# Patient Record
Sex: MALE | Race: WHITE | Employment: FULL TIME | URBAN - NONMETROPOLITAN AREA
[De-identification: names, ages, dates, MRNs, and addresses within clinical notes are randomized per-mention and may not be internally consistent; named-entity substitution may affect disease eponyms.]

---

## 2019-02-08 ENCOUNTER — HOSPITAL ENCOUNTER (EMERGENCY)
Age: 19
Discharge: HOME | End: 2019-02-08
Attending: EMERGENCY MEDICINE
Payer: COMMERCIAL

## 2019-02-08 ENCOUNTER — APPOINTMENT (OUTPATIENT)
Dept: GENERAL RADIOLOGY | Age: 19
End: 2019-02-08
Payer: COMMERCIAL

## 2019-02-08 VITALS
SYSTOLIC BLOOD PRESSURE: 127 MMHG | DIASTOLIC BLOOD PRESSURE: 69 MMHG | OXYGEN SATURATION: 96 % | HEART RATE: 68 BPM | RESPIRATION RATE: 16 BRPM | WEIGHT: 260.14 LBS | TEMPERATURE: 97.9 F

## 2019-02-08 DIAGNOSIS — R07.9 CHEST PAIN, UNSPECIFIED TYPE: Primary | ICD-10-CM

## 2019-02-08 LAB
ALBUMIN SERPL-MCNC: 4.7 G/DL (ref 3.5–5.2)
ALP BLD-CCNC: 86 U/L (ref 40–130)
ALT SERPL-CCNC: 44 U/L (ref 5–41)
ANION GAP SERPL CALCULATED.3IONS-SCNC: 12 MMOL/L (ref 7–19)
AST SERPL-CCNC: 33 U/L (ref 5–40)
BASOPHILS ABSOLUTE: 0.1 K/UL (ref 0–0.2)
BASOPHILS RELATIVE PERCENT: 0.6 % (ref 0–1)
BILIRUB SERPL-MCNC: 0.3 MG/DL (ref 0.2–1.2)
BUN BLDV-MCNC: 15 MG/DL (ref 6–20)
CALCIUM SERPL-MCNC: 9.8 MG/DL (ref 8.6–10)
CHLORIDE BLD-SCNC: 101 MMOL/L (ref 98–111)
CO2: 26 MMOL/L (ref 22–29)
CREAT SERPL-MCNC: 0.9 MG/DL (ref 0.5–1.2)
D DIMER: 0.38 UG/ML FEU (ref 0–0.48)
EKG P AXIS: 22 DEGREES
EKG P-R INTERVAL: 146 MS
EKG Q-T INTERVAL: 330 MS
EKG QRS DURATION: 84 MS
EKG QTC CALCULATION (BAZETT): 398 MS
EKG T AXIS: -12 DEGREES
EOSINOPHILS ABSOLUTE: 0.2 K/UL (ref 0–0.6)
EOSINOPHILS RELATIVE PERCENT: 1.8 % (ref 0–5)
GFR NON-AFRICAN AMERICAN: >60
GLUCOSE BLD-MCNC: 99 MG/DL (ref 74–109)
HCT VFR BLD CALC: 50.4 % (ref 42–52)
HEMOGLOBIN: 16.9 G/DL (ref 14–18)
LIPASE: 35 U/L (ref 13–60)
LYMPHOCYTES ABSOLUTE: 3 K/UL (ref 1.1–4.5)
LYMPHOCYTES RELATIVE PERCENT: 34.9 % (ref 20–40)
MCH RBC QN AUTO: 27.7 PG (ref 27–31)
MCHC RBC AUTO-ENTMCNC: 33.5 G/DL (ref 33–37)
MCV RBC AUTO: 82.5 FL (ref 80–94)
MONOCYTES ABSOLUTE: 0.9 K/UL (ref 0–0.9)
MONOCYTES RELATIVE PERCENT: 10.1 % (ref 0–10)
NEUTROPHILS ABSOLUTE: 4.5 K/UL (ref 1.5–7.5)
NEUTROPHILS RELATIVE PERCENT: 51.9 % (ref 50–65)
PDW BLD-RTO: 13.2 % (ref 11.5–14.5)
PLATELET # BLD: 188 K/UL (ref 130–400)
PMV BLD AUTO: 11.3 FL (ref 9.4–12.4)
POTASSIUM REFLEX MAGNESIUM: 4.1 MMOL/L (ref 3.5–5)
RBC # BLD: 6.11 M/UL (ref 4.7–6.1)
SODIUM BLD-SCNC: 139 MMOL/L (ref 136–145)
TOTAL PROTEIN: 7.8 G/DL (ref 6.6–8.7)
TROPONIN: <0.01 NG/ML (ref 0–0.03)
TROPONIN: <0.01 NG/ML (ref 0–0.03)
WBC # BLD: 8.7 K/UL (ref 4.8–10.8)

## 2019-02-08 PROCEDURE — C8928 TTE W OR W/O FOL W/CON,STRES: HCPCS

## 2019-02-08 PROCEDURE — 36415 COLL VENOUS BLD VENIPUNCTURE: CPT

## 2019-02-08 PROCEDURE — 85379 FIBRIN DEGRADATION QUANT: CPT

## 2019-02-08 PROCEDURE — 80053 COMPREHEN METABOLIC PANEL: CPT

## 2019-02-08 PROCEDURE — 71045 X-RAY EXAM CHEST 1 VIEW: CPT

## 2019-02-08 PROCEDURE — 6370000000 HC RX 637 (ALT 250 FOR IP): Performed by: EMERGENCY MEDICINE

## 2019-02-08 PROCEDURE — 93005 ELECTROCARDIOGRAM TRACING: CPT

## 2019-02-08 PROCEDURE — 96375 TX/PRO/DX INJ NEW DRUG ADDON: CPT

## 2019-02-08 PROCEDURE — 96376 TX/PRO/DX INJ SAME DRUG ADON: CPT

## 2019-02-08 PROCEDURE — 6360000002 HC RX W HCPCS: Performed by: EMERGENCY MEDICINE

## 2019-02-08 PROCEDURE — 85025 COMPLETE CBC W/AUTO DIFF WBC: CPT

## 2019-02-08 PROCEDURE — 96374 THER/PROPH/DIAG INJ IV PUSH: CPT

## 2019-02-08 PROCEDURE — 84484 ASSAY OF TROPONIN QUANT: CPT

## 2019-02-08 PROCEDURE — 83690 ASSAY OF LIPASE: CPT

## 2019-02-08 PROCEDURE — 2580000003 HC RX 258: Performed by: EMERGENCY MEDICINE

## 2019-02-08 PROCEDURE — 6360000004 HC RX CONTRAST MEDICATION: Performed by: EMERGENCY MEDICINE

## 2019-02-08 PROCEDURE — 99285 EMERGENCY DEPT VISIT HI MDM: CPT

## 2019-02-08 PROCEDURE — 2500000003 HC RX 250 WO HCPCS: Performed by: EMERGENCY MEDICINE

## 2019-02-08 PROCEDURE — 99285 EMERGENCY DEPT VISIT HI MDM: CPT | Performed by: EMERGENCY MEDICINE

## 2019-02-08 RX ORDER — SODIUM CHLORIDE 0.9 % (FLUSH) 0.9 %
10 SYRINGE (ML) INJECTION PRN
Status: DISCONTINUED | OUTPATIENT
Start: 2019-02-08 | End: 2019-02-08 | Stop reason: HOSPADM

## 2019-02-08 RX ORDER — ONDANSETRON 2 MG/ML
4 INJECTION INTRAMUSCULAR; INTRAVENOUS ONCE
Status: COMPLETED | OUTPATIENT
Start: 2019-02-08 | End: 2019-02-08

## 2019-02-08 RX ORDER — ASPIRIN 81 MG/1
324 TABLET, CHEWABLE ORAL ONCE
Status: COMPLETED | OUTPATIENT
Start: 2019-02-08 | End: 2019-02-08

## 2019-02-08 RX ORDER — HYDROCODONE BITARTRATE AND IBUPROFEN 7.5; 2 MG/1; MG/1
1 TABLET, FILM COATED ORAL EVERY 8 HOURS PRN
Qty: 10 TABLET | Refills: 0 | Status: SHIPPED | OUTPATIENT
Start: 2019-02-08 | End: 2019-02-11

## 2019-02-08 RX ORDER — SODIUM CHLORIDE 9 MG/ML
INJECTION, SOLUTION INTRAVENOUS
Status: COMPLETED | OUTPATIENT
Start: 2019-02-08 | End: 2019-02-08

## 2019-02-08 RX ORDER — MORPHINE SULFATE 2 MG/ML
2 INJECTION, SOLUTION INTRAMUSCULAR; INTRAVENOUS ONCE
Status: COMPLETED | OUTPATIENT
Start: 2019-02-08 | End: 2019-02-08

## 2019-02-08 RX ORDER — NITROGLYCERIN 0.4 MG/1
0.4 TABLET SUBLINGUAL EVERY 5 MIN PRN
Status: DISCONTINUED | OUTPATIENT
Start: 2019-02-08 | End: 2019-02-08 | Stop reason: HOSPADM

## 2019-02-08 RX ORDER — METOPROLOL TARTRATE 5 MG/5ML
5 INJECTION INTRAVENOUS PRN
Status: DISCONTINUED | OUTPATIENT
Start: 2019-02-08 | End: 2019-02-08 | Stop reason: HOSPADM

## 2019-02-08 RX ORDER — ATROPINE SULFATE 0.1 MG/ML
1 INJECTION INTRAVENOUS PRN
Status: DISCONTINUED | OUTPATIENT
Start: 2019-02-08 | End: 2019-02-08 | Stop reason: HOSPADM

## 2019-02-08 RX ORDER — KETOROLAC TROMETHAMINE 30 MG/ML
30 INJECTION, SOLUTION INTRAMUSCULAR; INTRAVENOUS ONCE
Status: COMPLETED | OUTPATIENT
Start: 2019-02-08 | End: 2019-02-08

## 2019-02-08 RX ORDER — 0.9 % SODIUM CHLORIDE 0.9 %
250 INTRAVENOUS SOLUTION INTRAVENOUS ONCE
Status: COMPLETED | OUTPATIENT
Start: 2019-02-08 | End: 2019-02-08

## 2019-02-08 RX ORDER — DOBUTAMINE HYDROCHLORIDE 200 MG/100ML
10 INJECTION INTRAVENOUS CONTINUOUS PRN
Status: DISCONTINUED | OUTPATIENT
Start: 2019-02-08 | End: 2019-02-08 | Stop reason: HOSPADM

## 2019-02-08 RX ADMIN — SODIUM CHLORIDE 250 ML: 9 INJECTION, SOLUTION INTRAVENOUS at 11:37

## 2019-02-08 RX ADMIN — Medication 10 ML: at 16:34

## 2019-02-08 RX ADMIN — SODIUM CHLORIDE: 9 INJECTION, SOLUTION INTRAVENOUS at 16:35

## 2019-02-08 RX ADMIN — NITROGLYCERIN 0.4 MG: 0.4 TABLET, ORALLY DISINTEGRATING SUBLINGUAL at 11:00

## 2019-02-08 RX ADMIN — MORPHINE SULFATE 2 MG: 2 INJECTION, SOLUTION INTRAMUSCULAR; INTRAVENOUS at 11:38

## 2019-02-08 RX ADMIN — METOPROLOL TARTRATE 5 MG: 1 INJECTION, SOLUTION INTRAVENOUS at 16:53

## 2019-02-08 RX ADMIN — ATROPINE SULFATE 1 MG: 0.1 INJECTION PARENTERAL at 16:46

## 2019-02-08 RX ADMIN — PERFLUTREN 2.2 MG: 6.52 INJECTION, SUSPENSION INTRAVENOUS at 16:32

## 2019-02-08 RX ADMIN — ASPIRIN 81 MG CHEWABLE TABLET 324 MG: 81 TABLET CHEWABLE at 10:59

## 2019-02-08 RX ADMIN — ONDANSETRON 4 MG: 2 INJECTION, SOLUTION INTRAMUSCULAR; INTRAVENOUS at 11:13

## 2019-02-08 RX ADMIN — DOBUTAMINE HYDROCHLORIDE 10 MCG/KG/MIN: 200 INJECTION INTRAVENOUS at 16:36

## 2019-02-08 RX ADMIN — KETOROLAC TROMETHAMINE 30 MG: 30 INJECTION, SOLUTION INTRAMUSCULAR; INTRAVENOUS at 15:03

## 2019-02-08 RX ADMIN — MORPHINE SULFATE 2 MG: 2 INJECTION, SOLUTION INTRAMUSCULAR; INTRAVENOUS at 14:21

## 2019-02-08 ASSESSMENT — ENCOUNTER SYMPTOMS
VOMITING: 1
COUGH: 0
SHORTNESS OF BREATH: 1
NAUSEA: 1
ABDOMINAL PAIN: 0
BACK PAIN: 0

## 2019-02-08 ASSESSMENT — PAIN SCALES - GENERAL
PAINLEVEL_OUTOF10: 6
PAINLEVEL_OUTOF10: 8
PAINLEVEL_OUTOF10: 4
PAINLEVEL_OUTOF10: 7

## 2019-02-12 LAB
EKG P AXIS: 13 DEGREES
EKG P-R INTERVAL: 178 MS
EKG Q-T INTERVAL: 402 MS
EKG QRS DURATION: 86 MS
EKG QTC CALCULATION (BAZETT): 405 MS
EKG T AXIS: -9 DEGREES

## 2019-09-04 ENCOUNTER — TRANSCRIBE ORDERS (OUTPATIENT)
Dept: ADMINISTRATIVE | Facility: HOSPITAL | Age: 19
End: 2019-09-04

## 2019-09-04 DIAGNOSIS — M79.601 RIGHT ARM PAIN: Primary | ICD-10-CM

## 2019-10-01 ENCOUNTER — HOSPITAL ENCOUNTER (OUTPATIENT)
Dept: NEUROLOGY | Facility: HOSPITAL | Age: 19
Discharge: HOME | End: 2019-10-01
Admitting: PHYSICIAN ASSISTANT

## 2019-10-01 DIAGNOSIS — M79.601 RIGHT ARM PAIN: ICD-10-CM

## 2019-10-01 PROCEDURE — 95909 NRV CNDJ TST 5-6 STUDIES: CPT

## 2019-10-01 PROCEDURE — 95886 MUSC TEST DONE W/N TEST COMP: CPT

## 2020-01-03 ENCOUNTER — TELEPHONE (OUTPATIENT)
Dept: URGENT CARE | Facility: CLINIC | Age: 20
End: 2020-01-03

## 2020-01-03 ENCOUNTER — HOSPITAL ENCOUNTER (OUTPATIENT)
Dept: GENERAL RADIOLOGY | Facility: HOSPITAL | Age: 20
Discharge: HOME | End: 2020-01-03
Admitting: NURSE PRACTITIONER

## 2020-01-03 ENCOUNTER — HOSPITAL ENCOUNTER (OUTPATIENT)
Dept: GENERAL RADIOLOGY | Facility: HOSPITAL | Age: 20
Discharge: HOME | End: 2020-01-03

## 2020-01-03 PROCEDURE — 72070 X-RAY EXAM THORAC SPINE 2VWS: CPT

## 2020-01-03 PROCEDURE — 72100 X-RAY EXAM L-S SPINE 2/3 VWS: CPT

## 2020-01-10 ENCOUNTER — APPOINTMENT (OUTPATIENT)
Dept: LAB | Facility: HOSPITAL | Age: 20
End: 2020-01-10

## 2020-01-10 ENCOUNTER — TRANSCRIBE ORDERS (OUTPATIENT)
Dept: ADMINISTRATIVE | Facility: HOSPITAL | Age: 20
End: 2020-01-10

## 2020-01-10 DIAGNOSIS — N46.01 AZOOSPERMIA: Primary | ICD-10-CM

## 2020-01-10 LAB
CHARACTER SMN: NORMAL
COLLECTION METHOD SMN: ABNORMAL
EPI CELLS #/AREA SMN HPF: ABNORMAL /HPF
LIQUEFACTION TIME SMN: NORMAL MIN
NON PROGRESSIVE MOTILITY: 6.1 %
RBC #/AREA SMN HPF: ABNORMAL /HPF
SEX ABSTIN DURATION TIME PATIENT: 3 DAYS
SLOW PROGRESSIVE MOTILITY: 5.9 %
SMI: 26 (ref 80–400)
SPEC CONTAINER SPEC: ABNORMAL
SPECIMEN VOL SMN: 0.8 ML
SPERM # SMN: 76.6 MILLIONS/ML
SPERM IMMOTILE NFR SMN: 86.4 %
SPERM MOTILE NFR SMN: 13.6 % MOTILE (ref 50–100)
SPERM PROG NFR SMN: 1.6 % (ref 25–100)
SPERM SMN: 3.1 % NORMAL (ref 15–100)
VISC SMN: NORMAL CP
WBC SMN QL: ABNORMAL /HPF
WHO STANDARD: ABNORMAL

## 2020-01-10 PROCEDURE — 89320 SEMEN ANAL VOL/COUNT/MOT: CPT | Performed by: ADVANCED PRACTICE MIDWIFE

## 2020-03-18 ENCOUNTER — TRANSCRIBE ORDERS (OUTPATIENT)
Dept: PHYSICAL THERAPY | Facility: CLINIC | Age: 20
End: 2020-03-18

## 2020-03-18 DIAGNOSIS — M54.50 LOW BACK PAIN WITHOUT SCIATICA, UNSPECIFIED BACK PAIN LATERALITY, UNSPECIFIED CHRONICITY: Primary | ICD-10-CM

## 2020-05-05 ENCOUNTER — TREATMENT (OUTPATIENT)
Dept: PHYSICAL THERAPY | Facility: CLINIC | Age: 20
End: 2020-05-05

## 2020-05-05 DIAGNOSIS — M54.40 CHRONIC BILATERAL LOW BACK PAIN WITH SCIATICA, SCIATICA LATERALITY UNSPECIFIED: ICD-10-CM

## 2020-05-05 DIAGNOSIS — G89.29 CHRONIC BILATERAL LOW BACK PAIN WITH SCIATICA, SCIATICA LATERALITY UNSPECIFIED: ICD-10-CM

## 2020-05-05 DIAGNOSIS — M54.6 CHRONIC BILATERAL THORACIC BACK PAIN: ICD-10-CM

## 2020-05-05 DIAGNOSIS — G89.29 CHRONIC BILATERAL THORACIC BACK PAIN: ICD-10-CM

## 2020-05-05 PROCEDURE — 97110 THERAPEUTIC EXERCISES: CPT | Performed by: PHYSICAL THERAPIST

## 2020-05-05 PROCEDURE — 97161 PT EVAL LOW COMPLEX 20 MIN: CPT | Performed by: PHYSICAL THERAPIST

## 2020-05-05 PROCEDURE — G0283 ELEC STIM OTHER THAN WOUND: HCPCS | Performed by: PHYSICAL THERAPIST

## 2020-05-05 NOTE — PROGRESS NOTES
"Outpatient Physical Therapy Ortho Initial Evaluation       Patient Name: Chris Hernandez  : 2000  MRN: [id number]  Today's Date: 2020      Visit Date: 2020    There is no problem list on file for this patient.       Past Medical History:   Diagnosis Date   • Anxiety    • PTSD (post-traumatic stress disorder)         Past Surgical History:   Procedure Laterality Date   • APPENDECTOMY         Visit Dx:     ICD-10-CM ICD-9-CM   1. Chronic bilateral low back pain with sciatica, sciatica laterality unspecified M54.40 724.2    G89.29 724.3     338.29   2. Chronic bilateral thoracic back pain M54.6 724.1    G89.29 338.29         Patient History     Row Name 2093             History    Chief Complaint  Pain  -KR      Type of Pain  Back pain  -KR      Date Current Problem(s) Began  20  -KR      Brief Description of Current Complaint  In January ducked down to get under a lift at work and felt a pop. He had pain and limited mobility for over a month, including being off work. He reports several night ago he started having growing pain/shooting pain down his R> L leg. He report daily \"numbness\" in feet or frozen feeling. He does state originally his right side was hurting more than L, but now both.   -KR         Pain     Pain Location  Back  -KR      Pain at Present  6  -KR      Pain at Best  6  -KR      Pain at Worst  8  -KR      Pain Description  Numbness \"freezing\" in feet  -KR      What Performance Factors Make the Current Problem(s) WORSE?  laying down  -KR        User Key  (r) = Recorded By, (t) = Taken By, (c) = Cosigned By    Initials Name Provider Type    Jessie Burns, PT DPT Physical Therapist          PT Ortho     Row Name 20 8630       Posture/Observations    Alignment Options  Thoracic kyphosis;Rounded shoulders;Lumbar lordosis  -KR    Thoracic Kyphosis  Mild;Increased  -KR    Rounded Shoulders  Mild;Moderate;Increased  -KR    Lumbar lordosis  Mild;Moderate;Increased  " -KR    Posture/Observations Comments  tends to  anterior pelvic tilted pelvis with increased abdominal adipose tissue  -KR       Quarter Clearing    Quarter Clearing  Lower Quarter Clearing  -KR       DTR- Lower Quarter Clearing    Patellar tendon (L2-4)  Bilateral:;2- Normal response  -KR    Achilles tendon (S1-2)  Bilateral:;2- Normal response  -KR       Pathological Reflexes- Lower Quarter Clearing    Clonus  Bilateral:;Negative  -KR       Sensory Screen for Light Touch- Lower Quarter Clearing    L1 (inguinal area)  Bilateral:;Intact  -KR    L2 (anterior mid thigh)  Bilateral:;Intact  -KR    L3 (distal anterior thigh)  Bilateral:;Intact  -KR    L4 (medial lower leg/foot)  Bilateral:;Intact  -KR    L5 (lateral lower leg/great toe)  Bilateral:;Intact  -KR    S1 (bottom of foot)  Bilateral:;Intact  -KR       Myotomal Screen- Lower Quarter Clearing    Hip flexion (L2)  Bilateral:;4- (Good -)  -KR    Knee extension (L3)  Bilateral:;5 (Normal)  -KR    Ankle DF (L4)  Right:;5 (Normal);Left:;4- (Good -)  -KR    Great toe extension (L5)  Right:;5 (Normal);Left:;4- (Good -)  -KR    Ankle PF (S1)  Bilateral:;4 (Good)  -KR    Knee flexion (S2)  Bilateral:;4 (Good)  -KR       Lumbar ROM Screen- Lower Quarter Clearing    Lumbar Flexion  Impaired 75% pain coming up  -KR    Lumbar Extension  Impaired 25% pain hinges upper lumbar  -KR       Special Tests/Palpation    Special Tests/Palpation  Lumbar/SI  -KR       Lumbar/SI Special Tests    SLR (Neural Tension)  Right:;Positive;Left:;Negative  -KR    Sacral Spring Test (SI Dysfunction)  Bilateral:;Negative  -KR       Lumbosacral Palpation    Lumbosacral Palpation?  Yes  -KR    Lumbosacral Segment  Bilateral:;Tender;Guarded/taut  -KR    Thoracolumbar Segment  Bilateral:;Tender;Guarded/taut  -KR    Quadratus Lumborum  Bilateral:;Tender;Guarded/taut  -KR    Erector Spinae (Paraspinals)  Bilateral:;Tender;Guarded/taut  -KR       General ROM    RT Lower Ext  Rt Hip External  Rotation;Rt Hip Internal Rotation;Rt Hip Flexion  -KR    LT Lower Ext  Lt Hip External Rotation;Lt Hip Internal Rotation;Lt Hip Flexion  -KR       Right Lower Ext    Rt Hip Flexion AROM  90  -KR    Rt Hip Flexion PROM  90  -KR    Rt Hip External Rotation PROM  35  -KR    Rt Hip Internal Rotation PROM  13  -KR       Left Lower Ext    Lt Hip Flexion AROM  110  -KR    Lt Hip Flexion PROM  110  -KR    Lt Hip External Rotation PROM  45  -KR    Lt Hip Internal Rotation PROM  15  -KR       MMT (Manual Muscle Testing)    General MMT Comments  hip abduction R 4-/5 L 4/5  -KR       Sensation    Light Touch  No apparent deficits  -KR       Pathomechanics    Spine Pathomechanics  Hinges into extension at one segment in lumbar  -KR       Balance Skills Training    SLS  RLE limited by pain; decreased core stability (B)  -KR      User Key  (r) = Recorded By, (t) = Taken By, (c) = Cosigned By    Initials Name Provider Type    Jessie Burns, PT DPT Physical Therapist                      Therapy Education  Education Details: SKC, TA/PPT, self traction  Given: HEP, Symptoms/condition management, Posture/body mechanics  Program: New  How Provided: Verbal, Demonstration, Written  Provided to: Patient  Level of Understanding: Verbalized, Demonstrated     PT OP Goals     Row Name 05/05/20 0930          PT Short Term Goals    STG Date to Achieve  06/04/20  -KR     STG 1  Pt will demonstrate 20 degrees or greater passive (B) hip IR.  -KR     STG 1 Progress  New  -KR        Long Term Goals    LTG Date to Achieve  07/04/20  -KR     LTG 1  Pt will score 10% or less on the modified ostwestry.   -KR     LTG 1 Progress  New  -KR     LTG 2  Pt will be independent with HEP to include core strengthening and flexibility.   -KR     LTG 2 Progress  New  -KR     LTG 3  Pt will report pain no greater than 1-2/10 at the end of a work day.   -KR     LTG 3 Progress  New  -KR        Time Calculation    PT Goal Re-Cert Due Date  06/04/20  -KR        User Key  (r) = Recorded By, (t) = Taken By, (c) = Cosigned By    Initials Name Provider Type    Jessie Burns, PT DPT Physical Therapist          PT Assessment/Plan     Row Name 05/05/20 0981          PT Assessment    Functional Limitations  Limitation in home management;Limitations in community activities;Performance in leisure activities;Performance in self-care ADL;Performance in work activities  -KR     Impairments  Joint mobility;Muscle strength;Pain;Posture;Range of motion  -KR     Assessment Comments  Chris presents with over 5 month history of lower thoracic/lumbar pain. He recently reports radicular type symptoms, but I did not reproduce those today. Dermatomes and reflexes were WFL. He does have decreased spinal and hip mobility, as well as decreased core stability and poor posture.   -KR     Please refer to paper survey for additional self-reported information  Yes  -KR     Rehab Potential  Good  -KR     Patient/caregiver participated in establishment of treatment plan and goals  Yes  -KR     Patient would benefit from skilled therapy intervention  Yes  -KR        PT Plan    Predicted Duration of Therapy Intervention (Therapy Eval)  10-12 visits  -KR     Planned CPT's?  PT EVAL LOW COMPLEXITY: 44100;PT RE-EVAL: 90974;PT THER PROC EA 15 MIN: 42552;PT THER ACT EA 15 MIN: 30051;PT MANUAL THERAPY EA 15 MIN: 83044;PT NEUROMUSC RE-EDUCATION EA 15 MIN: 83789;PT GAIT TRAINING EA 15 MIN: 39977;PT HOT OR COLD PACK TREAT MCARE;PT ELECTRICAL STIM UNATTEND: ;PT ELECTRICAL STIM ATTD EA 15 MIN: 29432;PT ULTRASOUND EA 15 MIN: 56469  -KR     PT Plan Comments  Initially we will work on spinal and hip mobility, then progress with core/hip stability and postural retraining. We will use modalities as needed and work on progressing his HEP.   -KR       User Key  (r) = Recorded By, (t) = Taken By, (c) = Cosigned By    Initials Name Provider Type    Jessie Burns PT DPT Physical Therapist           Modalities     Row Name 05/05/20 0930             ELECTRICAL STIMULATION    Attended/Unattended  Unattended  -KR      Stimulation Type  IFC  -KR      Max mAmp  26  -KR      Location/Electrode Placement/Other  low back  -KR       PT E-Stim Unattended (Manual) Minutes  15  -KR        User Key  (r) = Recorded By, (t) = Taken By, (c) = Cosigned By    Initials Name Provider Type    Jessie Burns, PT DPT Physical Therapist        OP Exercises     Row Name 05/05/20 1000 05/05/20 0930          Total Minutes    71817 - PT Therapeutic Exercise Minutes  --  10  -KR        Exercise 1    Exercise Name 1  SCK  -KR  --     Sets 1  2  -KR  --     Reps 1     -KR  --     Time 1  20 sec  -KR  --        Exercise 2    Exercise Name 2  standing TA/PPT  -KR  --     Cueing 2  Verbal  -KR  --     Sets 2  1  -KR  --     Reps 2  5  -KR  --       User Key  (r) = Recorded By, (t) = Taken By, (c) = Cosigned By    Initials Name Provider Type    Jessie Burns, PT DPT Physical Therapist                        Outcome Measure Options: Modifed Owestry  Modified Oswestry  Modified Oswestry Score/Comments: 20/50 40%      Time Calculation:     Total Timed Code Minutes- PT: 10 minute(s)         PT G-Codes  Outcome Measure Options: Modifed Owestry  Modified Oswestry Score/Comments: 20/50 40%         Jessie Waldron, PT DPT  5/5/2020

## 2020-05-11 ENCOUNTER — TREATMENT (OUTPATIENT)
Dept: PHYSICAL THERAPY | Facility: CLINIC | Age: 20
End: 2020-05-11

## 2020-05-11 DIAGNOSIS — G89.29 CHRONIC BILATERAL THORACIC BACK PAIN: ICD-10-CM

## 2020-05-11 DIAGNOSIS — M54.40 CHRONIC BILATERAL LOW BACK PAIN WITH SCIATICA, SCIATICA LATERALITY UNSPECIFIED: Primary | ICD-10-CM

## 2020-05-11 DIAGNOSIS — M54.6 CHRONIC BILATERAL THORACIC BACK PAIN: ICD-10-CM

## 2020-05-11 DIAGNOSIS — G89.29 CHRONIC BILATERAL LOW BACK PAIN WITH SCIATICA, SCIATICA LATERALITY UNSPECIFIED: Primary | ICD-10-CM

## 2020-05-11 PROCEDURE — 97140 MANUAL THERAPY 1/> REGIONS: CPT | Performed by: PHYSICAL THERAPIST

## 2020-05-11 NOTE — PROGRESS NOTES
Outpatient Physical Therapy Ortho Treatment Note       Patient Name: Chris Hernandez  : 2000  MRN: [id number]  Today's Date: 2020      Visit Date: 2020    Visit Dx:    ICD-10-CM ICD-9-CM   1. Chronic bilateral low back pain with sciatica, sciatica laterality unspecified M54.40 724.2    G89.29 724.3     338.29   2. Chronic bilateral thoracic back pain M54.6 724.1    G89.29 338.29       There is no problem list on file for this patient.       Past Medical History:   Diagnosis Date   • Anxiety    • PTSD (post-traumatic stress disorder)         Past Surgical History:   Procedure Laterality Date   • APPENDECTOMY                         PT Assessment/Plan     Row Name 20 1600          PT Assessment    Assessment Comments  His primary pain appears to be a stuck facet in his right TL area with secondary muscle spasms. His pain is reproduced with extension and intially he had very sharp pains which happen wtih stuck facets, particularly in this area. I didn't want to overdo it and just try to go for popping it but emphasized gentle maneuvering it to loosen and let go.   -TB        PT Plan    PT Plan Comments  Assess his tolerance of what I did today adn his new HEP to see if this helps. Cont to emphasize TL facet mobility.  -TB       User Key  (r) = Recorded By, (t) = Taken By, (c) = Cosigned By    Initials Name Provider Type    TB Fer Gonzalez, PT Physical Therapist            OP Exercises     Row Name 20 1600 20 3177          Subjective Comments    Subjective Comments  --  He says he still has some radicular symptoms into hsi right leg at least once a day or every other day. His c/c today is pain into his right TL area. It was worse when he was working. Pain in leg is worse after he gets home and rests.   -TB        Subjective Pain    Pre-Treatment Pain Level  --  4  -TB     Subjective Pain Comment  --  was 7/10 earlier today  -TB        Total Minutes    44397 - PT Manual Therapy  Minutes  50  -TB  --       User Key  (r) = Recorded By, (t) = Taken By, (c) = Cosigned By    Initials Name Provider Type    TB Fer Gonzalez, PT Physical Therapist                      Manual Rx (last 36 hours)      Manual Treatments     Row Name 05/11/20 1600 05/11/20 1500          Total Minutes    90815 - PT Manual Therapy Minutes  50  -TB  --        Manual Rx 1    Manual Rx 1 Location  --  prone TL area  -TB     Manual Rx 1 Type  --  attempted gentle PA's and upglide facet mobs, mostly on right  -TB     Manual Rx 1 Grade  --  too guarded;   -TB     Manual Rx 1 Duration  --  tried SAMANTHA but too painful after 1 min  -TB        Manual Rx 2    Manual Rx 2 Location  --  sidelying right TL paraspinals  -TB     Manual Rx 2 Type  --  STM  -TB        Manual Rx 3    Manual Rx 3 Location  --  sidelying right TL facet gapping  -TB     Manual Rx 3 Type  --  knees bent up with lumbar flexed to TL; thoracic rotated right down to TL;  -TB     Manual Rx 3 Grade  --  gentle rotation and SB OP  -TB     Manual Rx 3 Duration  --  no cavititation with gr 2-3 OP  -TB        Manual Rx 4    Manual Rx 4 Location  --  assessed hip mobility doris  -TB     Manual Rx 4 Type  --  right knee to chest increased rigth TL pain  -TB        Manual Rx 5    Manual Rx 5 Location  --  supine right LE  -TB     Manual Rx 5 Type  --  LAD using gait belt (towel to protect ankle)  -TB     Manual Rx 5 Grade  --  sustained mod strong OP; he felt like this stretched his TL adn felt like it wanted to pop  -TB        Manual Rx 6    Manual Rx 6 Location  --  seated TL right  -TB     Manual Rx 6 Type  --  left rotation with OP on right TL for left rotation  -TB     Manual Rx 6 Grade  --  used MET to rotate rigth isometrically, then enouraged left rotation with some left SB  -TB       User Key  (r) = Recorded By, (t) = Taken By, (c) = Cosigned By    Initials Name Provider Type    TB Fer Gonzalez, PT Physical Therapist          PT OP Goals     Row Name 05/11/20  1600          PT Short Term Goals    STG Date to Achieve  06/04/20  -TB     STG 1  Pt will demonstrate 20 degrees or greater passive (B) hip IR.  -TB     STG 1 Progress  Ongoing  -TB        Long Term Goals    LTG Date to Achieve  07/04/20  -TB     LTG 1  Pt will score 10% or less on the modified ostwestry.   -TB     LTG 1 Progress  Ongoing  -TB     LTG 2  Pt will be independent with HEP to include core strengthening and flexibility.   -TB     LTG 2 Progress  Ongoing  -TB     LTG 2 Progress Comments  changed to kneeling TL stretch  -TB     LTG 3  Pt will report pain no greater than 1-2/10 at the end of a work day.   -TB     LTG 3 Progress  Ongoing  -TB     LTG 3 Progress Comments  higher than 2/10 today  -TB        Time Calculation    PT Goal Re-Cert Due Date  06/04/20  -TB       User Key  (r) = Recorded By, (t) = Taken By, (c) = Cosigned By    Initials Name Provider Type    TB Fer Gonzalez, PT Physical Therapist          Therapy Education  Education Details: kneeling thoracic ext/flexion, SB and rotation doris wtih arms on couch or chair              Time Calculation:                    Fer Gonzalez, PT  5/11/2020

## 2020-05-13 ENCOUNTER — TREATMENT (OUTPATIENT)
Dept: PHYSICAL THERAPY | Facility: CLINIC | Age: 20
End: 2020-05-13

## 2020-05-13 DIAGNOSIS — M54.6 CHRONIC BILATERAL THORACIC BACK PAIN: ICD-10-CM

## 2020-05-13 DIAGNOSIS — M54.40 CHRONIC BILATERAL LOW BACK PAIN WITH SCIATICA, SCIATICA LATERALITY UNSPECIFIED: Primary | ICD-10-CM

## 2020-05-13 DIAGNOSIS — G89.29 CHRONIC BILATERAL LOW BACK PAIN WITH SCIATICA, SCIATICA LATERALITY UNSPECIFIED: Primary | ICD-10-CM

## 2020-05-13 DIAGNOSIS — G89.29 CHRONIC BILATERAL THORACIC BACK PAIN: ICD-10-CM

## 2020-05-13 PROCEDURE — 97110 THERAPEUTIC EXERCISES: CPT | Performed by: PHYSICAL THERAPIST

## 2020-05-13 PROCEDURE — 97140 MANUAL THERAPY 1/> REGIONS: CPT | Performed by: PHYSICAL THERAPIST

## 2020-05-13 NOTE — PROGRESS NOTES
Outpatient Physical Therapy Ortho Treatment Note       Patient Name: Chris Hernandez  : 2000  MRN: [id number]  Today's Date: 2020      Visit Date: 2020    Visit Dx:    ICD-10-CM ICD-9-CM   1. Chronic bilateral low back pain with sciatica, sciatica laterality unspecified M54.40 724.2    G89.29 724.3     338.29   2. Chronic bilateral thoracic back pain M54.6 724.1    G89.29 338.29       There is no problem list on file for this patient.       Past Medical History:   Diagnosis Date   • Anxiety    • PTSD (post-traumatic stress disorder)         Past Surgical History:   Procedure Laterality Date   • APPENDECTOMY                         PT Assessment/Plan     Row Name 20 1600          PT Assessment    Assessment Comments  He continues to demonstrate very taut and guarded musculature throughout the thoracic and lumbar region. He has increased pain today on the R with PAs and with mild rotation. He remians very hypomobile throughout the lumbar and lower thoracic spine. LAD provided relief today and during LTR rotation was limited to hte right and described as if he was hitting a wall.  -WJ        PT Plan    PT Plan Comments  Continue to work on his spinal mobility and gradually progress with strengthening of the hips and core as symptoms allow.  -WJ       User Key  (r) = Recorded By, (t) = Taken By, (c) = Cosigned By    Initials Name Provider Type    WEduardo Raines, PT DPT Physical Therapist            OP Exercises     Row Name 20 1649 20 1500          Subjective Comments    Subjective Comments  --  Pt reports that the day after his last session he was bale to get loosened up quicker than usual.  -WJ        Subjective Pain    Able to rate subjective pain?  --  yes  -WJ     Pre-Treatment Pain Level  --  4  -WJ     Post-Treatment Pain Level  --  6  -WJ        Total Minutes    38723 - PT Therapeutic Exercise Minutes  10  -WJ  --     32946 - PT Manual Therapy Minutes  33  -WJ  --         Exercise 1    Exercise Name 1  --  B hip stretching in all planes   -WJ     Cueing 1  --  Verbal  -WJ     Reps 1  --  3  -WJ     Time 1  --   30 sec each  -WJ     Additional Comments  --  atempted mobilization with belt, however no relief provided   -WJ        Exercise 2    Exercise Name 2  --  LTR on 45 cm SB.  -WJ     Cueing 2  --  Verbal  -WJ     Time 2  --  2 min   -WJ       User Key  (r) = Recorded By, (t) = Taken By, (c) = Cosigned By    Initials Name Provider Type    Eduardo Schilling, PT DPT Physical Therapist                      Manual Rx (last 36 hours)      Manual Treatments     Row Name 05/13/20 1649 05/13/20 1500          Total Minutes    04495 - PT Manual Therapy Minutes  33  -WJ  --        Manual Rx 1    Manual Rx 1 Location  --  thoracic, lumbar and gluteal  -WJ     Manual Rx 1 Type  --  IASTM with blue foam roller   -WJ     Manual Rx 1 Grade  --  mod OP  -WJ        Manual Rx 2    Manual Rx 2 Location  --  throracic and lumbar  -WJ     Manual Rx 2 Type  --  Central and Unilateral PAs  -WJ     Manual Rx 2 Grade  --  1-2  -WJ        Manual Rx 3    Manual Rx 3 Location  --  sidelying right TL facet gapping  -WJ     Manual Rx 3 Type  --  knees bent up with lumbar flexed to TL; thoracic rotated right down to TL;  -WJ     Manual Rx 3 Grade  --  gentle rotation and SB OP grade 1-2  -WJ        Manual Rx 4    Manual Rx 4 Location  --  lower thoracic and lumbar  -WJ     Manual Rx 4 Type  --  PIVMS  -WJ     Manual Rx 4 Grade  --  1-2  -WJ        Manual Rx 5    Manual Rx 5 Location  --  supine right LE  -WJ     Manual Rx 5 Type  --  LAD  -WJ     Manual Rx 5 Grade  --  mod OP  -WJ       User Key  (r) = Recorded By, (t) = Taken By, (c) = Cosigned By    Initials Name Provider Type    Eduardo Schilling, PT DPT Physical Therapist          PT OP Goals     Row Name 05/13/20 1649          PT Short Term Goals    STG Date to Achieve  06/04/20  -WJ     STG 1  Pt will demonstrate 20 degrees or greater passive (B) hip IR.   -WJ     STG 1 Progress  Ongoing  -WJ        Long Term Goals    LTG Date to Achieve  07/04/20  -WJ     LTG 1  Pt will score 10% or less on the modified ostwestry.   -WJ     LTG 1 Progress  Ongoing  -WJ     LTG 2  Pt will be independent with HEP to include core strengthening and flexibility.   -WJ     LTG 2 Progress  Ongoing  -WJ     LTG 3  Pt will report pain no greater than 1-2/10 at the end of a work day.   -WJ     LTG 3 Progress  Ongoing  -WJ     LTG 3 Progress Comments  4/10 today   -WJ        Time Calculation    PT Goal Re-Cert Due Date  06/04/20  -WJ       User Key  (r) = Recorded By, (t) = Taken By, (c) = Cosigned By    Initials Name Provider Type    Eduardo Schilling, PT DPT Physical Therapist          Therapy Education  Given: HEP, Symptoms/condition management, Posture/body mechanics  Program: Reinforced  How Provided: Verbal, Demonstration  Provided to: Patient  Level of Understanding: Verbalized, Demonstrated              Time Calculation:   Total Timed Code Minutes- PT: 43 minute(s)                Eduardo Pedroza, PT DPT  5/13/2020

## 2020-05-18 ENCOUNTER — TREATMENT (OUTPATIENT)
Dept: PHYSICAL THERAPY | Facility: CLINIC | Age: 20
End: 2020-05-18

## 2020-05-18 DIAGNOSIS — M54.40 CHRONIC BILATERAL LOW BACK PAIN WITH SCIATICA, SCIATICA LATERALITY UNSPECIFIED: Primary | ICD-10-CM

## 2020-05-18 DIAGNOSIS — M54.6 CHRONIC BILATERAL THORACIC BACK PAIN: ICD-10-CM

## 2020-05-18 DIAGNOSIS — G89.29 CHRONIC BILATERAL THORACIC BACK PAIN: ICD-10-CM

## 2020-05-18 DIAGNOSIS — G89.29 CHRONIC BILATERAL LOW BACK PAIN WITH SCIATICA, SCIATICA LATERALITY UNSPECIFIED: Primary | ICD-10-CM

## 2020-05-18 PROCEDURE — 97140 MANUAL THERAPY 1/> REGIONS: CPT | Performed by: PHYSICAL THERAPIST

## 2020-05-18 PROCEDURE — 97110 THERAPEUTIC EXERCISES: CPT | Performed by: PHYSICAL THERAPIST

## 2020-05-18 NOTE — PROGRESS NOTES
Outpatient Physical Therapy Ortho Treatment Note       Patient Name: Chris Hernandez  : 2000  MRN: [id number]  Today's Date: 2020      Visit Date: 2020    Visit Dx:    ICD-10-CM ICD-9-CM   1. Chronic bilateral low back pain with sciatica, sciatica laterality unspecified M54.40 724.2    G89.29 724.3     338.29   2. Chronic bilateral thoracic back pain M54.6 724.1    G89.29 338.29       There is no problem list on file for this patient.       Past Medical History:   Diagnosis Date   • Anxiety    • PTSD (post-traumatic stress disorder)         Past Surgical History:   Procedure Laterality Date   • APPENDECTOMY                         PT Assessment/Plan     Row Name 20 1500          PT Assessment    Assessment Comments  Mr. Hernandez remains very tight and guarded throughout the lumbar and thoracic spine. Following STM he did demonstrate increased thoracolumbar mobility today. Lumbopelvic manipulations were perfomred today without any caviatations. We progressed with some core activation activity in which he did well however did dmeonstrate fatigue.  -WJ        PT Plan    PT Plan Comments  Continue to work on his spinal and hip mobility along with proper core activation. Consider addtional manipulatios next session.  -WJ       User Key  (r) = Recorded By, (t) = Taken By, (c) = Cosigned By    Initials Name Provider Type    WEduardo Raines, PT DPT Physical Therapist            OP Exercises     Row Name 20 1500             Subjective Comments    Subjective Comments  Pt reports he is a little more sore today following doing some work over the weekend.  -WJ         Subjective Pain    Able to rate subjective pain?  yes  -WJ      Pre-Treatment Pain Level  5  -WJ      Post-Treatment Pain Level  5  -WJ         Total Minutes    64595 - PT Therapeutic Exercise Minutes  15  -WJ      83773 - PT Manual Therapy Minutes  30  -WJ         Exercise 1    Exercise Name 1  open books   -WJ      Cueing 1  Verbal;Demo   -WJ      Sets 1  1  -WJ      Reps 1  15  -WJ      Additional Comments  each side   -WJ         Exercise 2    Exercise Name 2  LTR on 65 cm SB.  -WJ      Cueing 2  Verbal  -WJ      Time 2  2 min   -WJ         Exercise 3    Exercise Name 3  HL 65 cm SB press  -WJ      Cueing 3  Verbal;Tactile  -WJ      Sets 3  2  -WJ      Reps 3  10  -WJ      Time 3  3 sec   -WJ        User Key  (r) = Recorded By, (t) = Taken By, (c) = Cosigned By    Initials Name Provider Type    Eduardo Schilling, PT DPT Physical Therapist                      Manual Rx (last 36 hours)      Manual Treatments     Row Name 05/18/20 1500             Total Minutes    67155 - PT Manual Therapy Minutes  30  -WJ         Manual Rx 1    Manual Rx 1 Location  lumbar   -WJ      Manual Rx 1 Type  STM with message cream   -WJ      Manual Rx 1 Grade  mod  -WJ         Manual Rx 2    Manual Rx 2 Location  throracic and lumbar  -WJ      Manual Rx 2 Type  Central and Unilateral PAs  -WJ      Manual Rx 2 Grade  1-2  -WJ         Manual Rx 3    Manual Rx 3 Location  sidelying right TL facet gapping  -WJ      Manual Rx 3 Type  knees bent up with lumbar flexed to TL; thoracic rotated right down to TL;  -WJ      Manual Rx 3 Grade  gentle rotation and SB OP grade 1-2  -WJ         Manual Rx 4    Manual Rx 4 Location  lower thoracic and lumbar  -WJ      Manual Rx 4 Type  PIVMS  -WJ      Manual Rx 4 Grade  1-2  -WJ      Manual Rx 4 Duration  increased pain so activity was ceased  -WJ         Manual Rx 5    Manual Rx 5 Location  Lumbopelcive manipulation   -WJ      Manual Rx 5 Type  HVLA  -WJ      Manual Rx 5 Duration  no vacitation noted   -WJ        User Key  (r) = Recorded By, (t) = Taken By, (c) = Cosigned By    Initials Name Provider Type    WEduardo Raines, PT DPT Physical Therapist          PT OP Goals     Row Name 05/18/20 1500          PT Short Term Goals    STG Date to Achieve  06/04/20  -WJ     STG 1  Pt will demonstrate 20 degrees or greater passive (B) hip IR.   -WJ     STG 1 Progress  Ongoing  -WJ        Long Term Goals    LTG Date to Achieve  07/04/20  -WJ     LTG 1  Pt will score 10% or less on the modified ostwestry.   -WJ     LTG 1 Progress  Ongoing  -WJ     LTG 2  Pt will be independent with HEP to include core strengthening and flexibility.   -WJ     LTG 2 Progress  Ongoing  -WJ     LTG 3  Pt will report pain no greater than 1-2/10 at the end of a work day.   -WJ     LTG 3 Progress  Ongoing  -WJ        Time Calculation    PT Goal Re-Cert Due Date  06/04/20  -WJ       User Key  (r) = Recorded By, (t) = Taken By, (c) = Cosigned By    Initials Name Provider Type    Eduardo Schilling, PT DPT Physical Therapist          Therapy Education  Education Details: open books  Given: HEP, Symptoms/condition management, Posture/body mechanics  Program: New  How Provided: Verbal, Demonstration  Provided to: Patient  Level of Understanding: Verbalized, Demonstrated              Time Calculation:   Total Timed Code Minutes- PT: 45 minute(s)                Eduardo Pedroza, PT DPT  5/18/2020

## 2020-05-20 ENCOUNTER — TREATMENT (OUTPATIENT)
Dept: PHYSICAL THERAPY | Facility: CLINIC | Age: 20
End: 2020-05-20

## 2020-05-20 DIAGNOSIS — M54.6 CHRONIC BILATERAL THORACIC BACK PAIN: ICD-10-CM

## 2020-05-20 DIAGNOSIS — G89.29 CHRONIC BILATERAL LOW BACK PAIN WITH SCIATICA, SCIATICA LATERALITY UNSPECIFIED: Primary | ICD-10-CM

## 2020-05-20 DIAGNOSIS — M54.40 CHRONIC BILATERAL LOW BACK PAIN WITH SCIATICA, SCIATICA LATERALITY UNSPECIFIED: Primary | ICD-10-CM

## 2020-05-20 DIAGNOSIS — G89.29 CHRONIC BILATERAL THORACIC BACK PAIN: ICD-10-CM

## 2020-05-20 PROCEDURE — 97140 MANUAL THERAPY 1/> REGIONS: CPT | Performed by: PHYSICAL THERAPIST

## 2020-05-20 NOTE — PROGRESS NOTES
Outpatient Physical Therapy Ortho Treatment Note       Patient Name: Chris Hernandez  : 2000  MRN: [id number]  Today's Date: 2020      Visit Date: 2020    Visit Dx:    ICD-10-CM ICD-9-CM   1. Chronic bilateral low back pain with sciatica, sciatica laterality unspecified M54.40 724.2    G89.29 724.3     338.29   2. Chronic bilateral thoracic back pain M54.6 724.1    G89.29 338.29       There is no problem list on file for this patient.       Past Medical History:   Diagnosis Date   • Anxiety    • PTSD (post-traumatic stress disorder)         Past Surgical History:   Procedure Laterality Date   • APPENDECTOMY                         PT Assessment/Plan     Row Name 20 1500          PT Assessment    Assessment Comments  Todays session was cut 10 minutes short due to the patient arriving late. There was a cavitation noted with lumbopelvic maanipulatiojn today and there did seem to be more mobility following this. We also tried figure 8 lumbar traction, he reported initial releif with this and then pain so activity was ceased. We finisshed the session with gluteal strengthenig activities and post session he reported a reduction in pain.  -WJ        PT Plan    PT Plan Comments  Continue to work on his mobility while progressing with hip and core strengthening.  -WJ       User Key  (r) = Recorded By, (t) = Taken By, (c) = Cosigned By    Initials Name Provider Type    WEduardo Raines, PT DPT Physical Therapist            OP Exercises     Row Name 20 1500             Subjective Comments    Subjective Comments  Pt reports he has been doing about the same since his last session.  -WJ         Subjective Pain    Able to rate subjective pain?  yes  -WJ      Pre-Treatment Pain Level  5  -WJ      Post-Treatment Pain Level  2  -WJ         Total Minutes    95331 - PT Therapeutic Exercise Minutes  10  -WJ      56743 - PT Manual Therapy Minutes  25  -WJ         Exercise 1    Exercise Name 1  TOMAS leahy    -WJ      Cueing 1  Verbal  -WJ      Sets 1  2  -WJ      Reps 1  10  -WJ         Exercise 2    Exercise Name 2  B clamshells with RTB  -WJ      Cueing 2  Verbal  -WJ      Sets 2  2  -WJ      Reps 2  10  -WJ        User Key  (r) = Recorded By, (t) = Taken By, (c) = Cosigned By    Initials Name Provider Type    Eduardo Schilling, PT DPT Physical Therapist                      Manual Rx (last 36 hours)      Manual Treatments     Row Name 05/20/20 1500             Total Minutes    26992 - PT Manual Therapy Minutes  25  -WJ         Manual Rx 1    Manual Rx 1 Location  thoracic and lumbar  -WJ      Manual Rx 1 Type  IASTM with blue ridged foam roller  -WJ      Manual Rx 1 Grade  mod OP  -WJ         Manual Rx 2    Manual Rx 2 Location  throracic and lumbar  -WJ      Manual Rx 2 Type  Central PAs  -WJ      Manual Rx 2 Grade  2  -WJ         Manual Rx 3    Manual Rx 3 Location  lumbar  -WJ      Manual Rx 3 Type  lumbar traction figure 8 with belt  -WJ         Manual Rx 5    Manual Rx 5 Location  Lumbopelcive manipulation   -WJ      Manual Rx 5 Type  HVLA  -WJ      Manual Rx 5 Grade  5  -WJ      Manual Rx 5 Duration  1 cavitation noted  -WJ        User Key  (r) = Recorded By, (t) = Taken By, (c) = Cosigned By    Initials Name Provider Type    Eduardo Schilling, PT DPT Physical Therapist          PT OP Goals     Row Name 05/20/20 1640          PT Short Term Goals    STG Date to Achieve  06/04/20  -WJ     STG 1  Pt will demonstrate 20 degrees or greater passive (B) hip IR.  -WJ     STG 1 Progress  Ongoing  -WJ        Long Term Goals    LTG Date to Achieve  07/04/20  -WJ     LTG 1  Pt will score 10% or less on the modified ostwestry.   -WJ     LTG 1 Progress  Ongoing  -WJ     LTG 2  Pt will be independent with HEP to include core strengthening and flexibility.   -WJ     LTG 2 Progress  Ongoing  -WJ     LTG 2 Progress Comments  progressed with core activity this date  -WJ     LTG 3  Pt will report pain no greater than 1-2/10 at  the end of a work day.   -WJ     LTG 3 Progress  Ongoing  -WJ       User Key  (r) = Recorded By, (t) = Taken By, (c) = Cosigned By    Initials Name Provider Type    Eduardo Schilling, PT DPT Physical Therapist          Therapy Education  Given: HEP, Symptoms/condition management, Posture/body mechanics  Program: Reinforced  How Provided: Verbal, Demonstration  Provided to: Patient  Level of Understanding: Verbalized, Demonstrated              Time Calculation:                    Eduardo Pedroza, PT DPT  5/20/2020

## 2020-05-26 ENCOUNTER — TREATMENT (OUTPATIENT)
Dept: PHYSICAL THERAPY | Facility: CLINIC | Age: 20
End: 2020-05-26

## 2020-05-26 DIAGNOSIS — M54.6 CHRONIC BILATERAL THORACIC BACK PAIN: ICD-10-CM

## 2020-05-26 DIAGNOSIS — G89.29 CHRONIC BILATERAL LOW BACK PAIN WITH SCIATICA, SCIATICA LATERALITY UNSPECIFIED: Primary | ICD-10-CM

## 2020-05-26 DIAGNOSIS — M54.40 CHRONIC BILATERAL LOW BACK PAIN WITH SCIATICA, SCIATICA LATERALITY UNSPECIFIED: Primary | ICD-10-CM

## 2020-05-26 DIAGNOSIS — G89.29 CHRONIC BILATERAL THORACIC BACK PAIN: ICD-10-CM

## 2020-05-26 PROCEDURE — 97110 THERAPEUTIC EXERCISES: CPT | Performed by: PHYSICAL THERAPIST

## 2020-05-26 PROCEDURE — 97140 MANUAL THERAPY 1/> REGIONS: CPT | Performed by: PHYSICAL THERAPIST

## 2020-05-26 NOTE — PROGRESS NOTES
Outpatient Physical Therapy Ortho Treatment Note       Patient Name: Chris Hernandez  : 2000  MRN: [id number]  Today's Date: 2020      Visit Date: 2020    Visit Dx:    ICD-10-CM ICD-9-CM   1. Chronic bilateral low back pain with sciatica, sciatica laterality unspecified M54.40 724.2    G89.29 724.3     338.29   2. Chronic bilateral thoracic back pain M54.6 724.1    G89.29 338.29       There is no problem list on file for this patient.       Past Medical History:   Diagnosis Date   • Anxiety    • PTSD (post-traumatic stress disorder)         Past Surgical History:   Procedure Laterality Date   • APPENDECTOMY                         PT Assessment/Plan     Row Name 20 1600          PT Assessment    Assessment Comments  Pt reports thta he was more active since his last session and has had low levels of pain. He had back spasms today in his lower thoracic and upper lumbar paraspinals in the prone position. Once posiitions changed his pain was releived. We progressed with addtional hip/core strengthening. He tolerated this well, however fatigue was noted in B hip abductors requiring breaks during his second set.  -WJ        PT Plan    PT Plan Comments  He retunrs tomorrow due to thte short week. assess his repsinse to today session and contionue to work on hip and core strength while bolstering his HEP.  -WJ       User Key  (r) = Recorded By, (t) = Taken By, (c) = Cosigned By    Initials Name Provider Type    WJ Eduardo Pedroza, PT DPT Physical Therapist            OP Exercises     Row Name 20 1500             Subjective Comments    Subjective Comments  Pt reports that he has been more active lately and his pain levels have remained lower.  -WJ         Subjective Pain    Able to rate subjective pain?  yes  -WJ      Pre-Treatment Pain Level  3  -WJ      Post-Treatment Pain Level  0  -WJ         Total Minutes    07591 - PT Therapeutic Exercise Minutes  25  -WJ      81639 - PT Manual Therapy  Minutes  15  -WJ         Exercise 1    Exercise Name 1  SL R open books  -WJ      Cueing 1  Verbal;Tactile  -WJ      Sets 1  1  -WJ      Reps 1  15  -WJ         Exercise 2    Exercise Name 2  B hip flexion/extension on 65 SB  -WJ      Cueing 2  Verbal  -WJ      Time 2  2 min   -WJ         Exercise 3    Exercise Name 3  HL 65 cm SB press  -WJ      Cueing 3  Verbal  -WJ      Sets 3  2  -WJ      Reps 3  10  -WJ      Time 3  3 sec   -WJ         Exercise 4    Exercise Name 4  HL bridges with ER at peak   -WJ      Cueing 4  Tactile;Verbal  -WJ      Sets 4  2  -WJ      Reps 4  10  -WJ         Exercise 5    Exercise Name 5  SL SLR  -WJ      Cueing 5  Verbal;Tactile  -WJ      Sets 5  2  -WJ      Reps 5  10  -WJ      Additional Comments  broke up 2nd set into reps of 5  -WJ         Exercise 6    Exercise Name 6  Shuttle press   -WJ      Cueing 6  Verbal;Tactile  -WJ      Sets 6  2  -WJ      Reps 6  15  -WJ         Exercise 7    Exercise Name 7  SL Shuttle press  -WJ      Cueing 7  Verbal;Tactile  -WJ      Sets 7  2  -WJ      Reps 7  10  -WJ         Exercise 8    Exercise Name 8  standing hip abduction   -WJ      Cueing 8  Verbal;Tactile  -WJ      Sets 8  2  -WJ      Reps 8  10  -WJ         Exercise 9    Exercise Name 9  standing hip extension   -WJ      Cueing 9  Verbal;Tactile  -WJ      Sets 9  2  -WJ      Reps 9  10  -WJ        User Key  (r) = Recorded By, (t) = Taken By, (c) = Cosigned By    Initials Name Provider Type    WJ Eduardo Pedroza, PT DPT Physical Therapist                      Manual Rx (last 36 hours)      Manual Treatments     Row Name 05/26/20 1500             Total Minutes    57859 - PT Manual Therapy Minutes  15  -WJ         Manual Rx 1    Manual Rx 1 Location  thoracic and lumbar  -WJ      Manual Rx 1 Type  IASTM with blue ridged foam roller  -WJ      Manual Rx 1 Grade  mod OP  -WJ         Manual Rx 2    Manual Rx 2 Location  throracic and lumbar  -WJ      Manual Rx 2 Type  Central PAs  -WJ      Manual Rx  2 Duration  musscle spasm in lower thoracic and upper lumbar parspinals  -WJ        User Key  (r) = Recorded By, (t) = Taken By, (c) = Cosigned By    Initials Name Provider Type    Eduardo Schilling, MEHRDAD DPT Physical Therapist          PT OP Goals     Row Name 05/26/20 1500          PT Short Term Goals    STG Date to Achieve  06/04/20  -WJ     STG 1  Pt will demonstrate 20 degrees or greater passive (B) hip IR.  -WJ     STG 1 Progress  Ongoing  -WJ        Long Term Goals    LTG Date to Achieve  07/04/20  -WJ     LTG 1  Pt will score 10% or less on the modified ostwestry.   -WJ     LTG 1 Progress  Ongoing  -WJ     LTG 2  Pt will be independent with HEP to include core strengthening and flexibility.   -WJ     LTG 2 Progress  Ongoing  -WJ     LTG 3  Pt will report pain no greater than 1-2/10 at the end of a work day.   -WJ     LTG 3 Progress  Ongoing  -WJ     LTG 3 Progress Comments  3/10 today  -WJ        Time Calculation    PT Goal Re-Cert Due Date  06/04/20  -WJ       User Key  (r) = Recorded By, (t) = Taken By, (c) = Cosigned By    Initials Name Provider Type    Eduardo Schilling PT DPT Physical Therapist          Therapy Education  Given: HEP, Symptoms/condition management, Posture/body mechanics  Program: Reinforced  How Provided: Verbal, Demonstration  Provided to: Patient  Level of Understanding: Verbalized, Demonstrated              Time Calculation:   Total Timed Code Minutes- PT: 40 minute(s)                Eduardo Pedroza PT DPT  5/26/2020

## 2020-06-03 ENCOUNTER — TREATMENT (OUTPATIENT)
Dept: PHYSICAL THERAPY | Facility: CLINIC | Age: 20
End: 2020-06-03

## 2020-06-03 DIAGNOSIS — M54.6 CHRONIC BILATERAL THORACIC BACK PAIN: ICD-10-CM

## 2020-06-03 DIAGNOSIS — M54.40 CHRONIC BILATERAL LOW BACK PAIN WITH SCIATICA, SCIATICA LATERALITY UNSPECIFIED: Primary | ICD-10-CM

## 2020-06-03 DIAGNOSIS — G89.29 CHRONIC BILATERAL THORACIC BACK PAIN: ICD-10-CM

## 2020-06-03 DIAGNOSIS — G89.29 CHRONIC BILATERAL LOW BACK PAIN WITH SCIATICA, SCIATICA LATERALITY UNSPECIFIED: Primary | ICD-10-CM

## 2020-06-03 PROCEDURE — 97140 MANUAL THERAPY 1/> REGIONS: CPT | Performed by: PHYSICAL THERAPIST

## 2020-06-03 PROCEDURE — 97110 THERAPEUTIC EXERCISES: CPT | Performed by: PHYSICAL THERAPIST

## 2020-06-03 NOTE — PROGRESS NOTES
Outpatient Physical Therapy Ortho Progress Note       Patient Name: Chris Hernandez  : 2000  MRN: [id number]  Today's Date: 6/3/2020      Visit Date: 2020    Visit Dx:    ICD-10-CM ICD-9-CM   1. Chronic bilateral low back pain with sciatica, sciatica laterality unspecified M54.40 724.2    G89.29 724.3     338.29   2. Chronic bilateral thoracic back pain M54.6 724.1    G89.29 338.29       There is no problem list on file for this patient.       Past Medical History:   Diagnosis Date   • Anxiety    • PTSD (post-traumatic stress disorder)         Past Surgical History:   Procedure Laterality Date   • APPENDECTOMY                         PT Assessment/Plan     Row Name 20 1500          PT Assessment    Functional Limitations  Limitation in home management;Limitations in community activities;Performance in leisure activities;Performance in self-care ADL;Performance in work activities  -WJ     Impairments  Joint mobility;Muscle strength;Pain;Posture;Range of motion  -WJ     Assessment Comments  Today was the first visit back after missing a week of therapy due to his work schedule. He reports that his pain has been better during the day while at work but increases at night when he gets home. He continues to demonstrate ROM deficits in the hip along with the spine. We have been working on core and hip muscle recruitment and this is improivng however he still has difficulty at times. Addtional therapy is indicated to strengthen his hips and core to decrease the load on the lumbar spine.  -WJ     Rehab Potential  Good  -WJ     Patient/caregiver participated in establishment of treatment plan and goals  Yes  -WJ     Patient would benefit from skilled therapy intervention  Yes  -WJ        PT Plan    PT Frequency  2x/week  -WJ     Predicted Duration of Therapy Intervention (Therapy Eval)  4 weeks  -WJ     Planned CPT's?  PT RE-EVAL: 77413;PT THER PROC EA 15 MIN: 13153;PT THER ACT EA 15 MIN: 85580;PT MANUAL  THERAPY EA 15 MIN: 07613;PT NEUROMUSC RE-EDUCATION EA 15 MIN: 75413;PT GAIT TRAINING EA 15 MIN: 81832;PT SELF CARE/HOME MGMT/TRAIN EA 15: 09383;PT ELECTRICAL STIM UNATTEND: ;PT ELECTRICAL STIM ATTD EA 15 MIN: 83698;PT ULTRASOUND EA 15 MIN: 06514  -WJ     PT Plan Comments  Continue to work on his hip and spinal mobility. Monitor his symptoms as he reports symptoms somewhat similar to those of radicular in nature. Progress with hip and core strengthening as symptoms allow.  -WJ       User Key  (r) = Recorded By, (t) = Taken By, (c) = Cosigned By    Initials Name Provider Type    Eduardo Schilling PT DPT Physical Therapist            OP Exercises     Row Name 06/03/20 1500             Subjective Comments    Subjective Comments  Pt reports decreased pain throughout the day, however he is having increased pain at the end of the day.  -WJ         Subjective Pain    Able to rate subjective pain?  yes  -WJ      Pre-Treatment Pain Level  3  -WJ         Total Minutes    90906 - PT Therapeutic Exercise Minutes  30  -WJ      12018 - PT Manual Therapy Minutes  15  -WJ         Exercise 1    Exercise Name 1  addressed goals for progree note  -WJ         Exercise 2    Exercise Name 2  SL hip SLR   -WJ      Cueing 2  Verbal;Tactile  -WJ      Sets 2  2  -WJ      Reps 2  10  -WJ         Exercise 3    Exercise Name 3  HL bridges with ER at peak GTB  -WJ      Cueing 3  Verbal;Tactile  -WJ      Sets 3  2  -WJ      Reps 3  10  -WJ         Exercise 4    Exercise Name 4  standing SB press  -WJ      Cueing 4  Verbal;Tactile  -WJ      Sets 4  2  -WJ      Reps 4  10  -WJ      Time 4  3 sec hold  -WJ         Exercise 5    Exercise Name 5  seated marches on 65 CM SB  -WJ      Cueing 5  Verbal;Tactile;Demo  -WJ      Sets 5  2  -WJ      Reps 5  10  -WJ        User Key  (r) = Recorded By, (t) = Taken By, (c) = Cosigned By    Initials Name Provider Type    Eduardo Schilling PT DPT Physical Therapist                      Manual Rx (last 36  hours)      Manual Treatments     Row Name 06/03/20 1500             Total Minutes    16282 - PT Manual Therapy Minutes  15  -WJ         Manual Rx 1    Manual Rx 1 Location  thoracic, lumbar, glutes  -WJ      Manual Rx 1 Type  IASTM with blue ridged foam roller  -WJ      Manual Rx 1 Grade  mod OP  -WJ         Manual Rx 2    Manual Rx 2 Location  throracic  -WJ      Manual Rx 2 Type  Central PAs  -WJ      Manual Rx 2 Duration  attempted lumbar, however very tender  -WJ        User Key  (r) = Recorded By, (t) = Taken By, (c) = Cosigned By    Initials Name Provider Type    WEduardo Raines, PT DPT Physical Therapist          PT OP Goals     Row Name 06/03/20 1500          PT Short Term Goals    STG Date to Achieve  06/04/20  -WJ     STG 1  Pt will demonstrate 20 degrees or greater passive (B) hip IR.  -WJ     STG 1 Progress  Ongoing  -WJ     STG 1 Progress Comments  R 18 deg; L 14 deg  -WJ        Long Term Goals    LTG Date to Achieve  07/04/20  -WJ     LTG 1  Pt will score 10% or less on the modified ostwestry.   -WJ     LTG 1 Progress  Ongoing  -WJ     LTG 1 Progress Comments  28%  -WJ     LTG 2  Pt will be independent with HEP to include core strengthening and flexibility.   -WJ     LTG 2 Progress  Ongoing  -WJ     LTG 2 Progress Comments  pt reports compliance  -WJ     LTG 3  Pt will report pain no greater than 1-2/10 at the end of a work day.   -WJ     LTG 3 Progress  Ongoing  -WJ     LTG 3 Progress Comments  7-8/10 at the end of the day  -WJ        Time Calculation    PT Goal Re-Cert Due Date  07/03/20  -W       User Key  (r) = Recorded By, (t) = Taken By, (c) = Cosigned By    Initials Name Provider Type    Eduardo Schilling, PT DPT Physical Therapist          Therapy Education  Education Details: added standing hip abduction and extension last session  Given: HEP, Symptoms/condition management, Posture/body mechanics  Program: Reinforced, New  How Provided: Verbal, Demonstration  Provided to: Patient  Level  of Understanding: Verbalized, Demonstrated    Outcome Measure Options: Modifed Owestry  Modified Oswestry  Modified Oswestry Score/Comments: 28/50      Time Calculation:   Total Timed Code Minutes- PT: 45 minute(s)      PT G-Codes  Outcome Measure Options: Modifed Owestry  Modified Oswestry Score/Comments: 28/50         Eduardo Pedroza, PT DPT  6/3/2020

## 2020-06-23 ENCOUNTER — TREATMENT (OUTPATIENT)
Dept: PHYSICAL THERAPY | Facility: CLINIC | Age: 20
End: 2020-06-23

## 2020-06-23 DIAGNOSIS — G89.29 CHRONIC BILATERAL LOW BACK PAIN WITH SCIATICA, SCIATICA LATERALITY UNSPECIFIED: Primary | ICD-10-CM

## 2020-06-23 DIAGNOSIS — G89.29 CHRONIC BILATERAL THORACIC BACK PAIN: ICD-10-CM

## 2020-06-23 DIAGNOSIS — M54.6 CHRONIC BILATERAL THORACIC BACK PAIN: ICD-10-CM

## 2020-06-23 DIAGNOSIS — M54.40 CHRONIC BILATERAL LOW BACK PAIN WITH SCIATICA, SCIATICA LATERALITY UNSPECIFIED: Primary | ICD-10-CM

## 2020-06-23 PROCEDURE — 97110 THERAPEUTIC EXERCISES: CPT | Performed by: PHYSICAL THERAPIST

## 2020-06-23 NOTE — PROGRESS NOTES
Outpatient Physical Therapy Ortho Progress Note       Patient Name: Chris Hernandez  : 2000  MRN: [id number]  Today's Date: 2020      Visit Date: 2020    Visit Dx:    ICD-10-CM ICD-9-CM   1. Chronic bilateral low back pain with sciatica, sciatica laterality unspecified M54.40 724.2    G89.29 724.3     338.29   2. Chronic bilateral thoracic back pain M54.6 724.1    G89.29 338.29       There is no problem list on file for this patient.       Past Medical History:   Diagnosis Date   • Anxiety    • PTSD (post-traumatic stress disorder)         Past Surgical History:   Procedure Laterality Date   • APPENDECTOMY                         PT Assessment/Plan     Row Name 20 1500          PT Assessment    Functional Limitations  Limitation in home management;Limitations in community activities;Performance in leisure activities;Performance in self-care ADL;Performance in work activities  -WJ     Impairments  Joint mobility;Muscle strength;Pain;Posture;Range of motion  -WJ     Assessment Comments  It has been about 3 weeks since we have seen Chris due to being sick. He reports that last week he was knocked on the ground by a cow and felt a pop in his low back and since that time he has been pain free. At this time he has met one and partially met another one of his 4 goals. We progressed with more standing strengtheing activities and he tolerated this well without an exacerbation of symptoms.  -WJ     Rehab Potential  Good  -WJ     Patient/caregiver participated in establishment of treatment plan and goals  Yes  -WJ     Patient would benefit from skilled therapy intervention  Yes  -WJ        PT Plan    PT Frequency  2x/week  -WJ     Predicted Duration of Therapy Intervention (Therapy Eval)  4 weeks  -WJ     Planned CPT's?  PT RE-EVAL: 78022;PT THER PROC EA 15 MIN: 34332;PT THER ACT EA 15 MIN: 25202;PT MANUAL THERAPY EA 15 MIN: 78635;PT NEUROMUSC RE-EDUCATION EA 15 MIN: 90794;PT GAIT TRAINING EA 15 MIN:  42134;PT ELECTRICAL STIM UNATTEND: ;PT ELECTRICAL STIM ATTD EA 15 MIN: 61288;PT ULTRASOUND EA 15 MIN: 21095;PT HOT/COLD PACK WC NONMCARE: 75546  -WJ     PT Plan Comments  We beba melanienue to progress with more functional hip and core strengthening activities.  -WJ       User Key  (r) = Recorded By, (t) = Taken By, (c) = Cosigned By    Initials Name Provider Type    WJ Eduardo Pedroza, PT DPT Physical Therapist            OP Exercises     Row Name 06/23/20 1515 06/23/20 1500          Subjective Comments    Subjective Comments  --  Pt reports that he has been sick but is feeling better. He had a cow knock him over the other ay felt a pop and has felt better since.  -WJ        Subjective Pain    Able to rate subjective pain?  --  yes  -WJ     Pre-Treatment Pain Level  --  0  -WJ     Post-Treatment Pain Level  --  0  -WJ        Total Minutes    27674 - PT Therapeutic Exercise Minutes  45  -WJ  --        Exercise 1    Exercise Name 1  --  SL brdige  -WJ     Cueing 1  --  Verbal  -WJ     Sets 1  --  2  -WJ     Reps 1  --  10  -WJ     Additional Comments  --  each leg   -WJ        Exercise 2    Exercise Name 2  --  side plank with clamshell   -WJ     Cueing 2  --  Verbal;Demo  -WJ     Sets 2  --  2  -WJ     Reps 2  --  10  -WJ     Additional Comments  --  each side   -WJ        Exercise 3    Exercise Name 3  --  quadruped bird dogs   -WJ     Cueing 3  --  Verbal;Demo  -WJ     Sets 3  --  2  -WJ     Reps 3  --  10  -WJ        Exercise 4    Exercise Name 4  --  B star excursion with red can-do   -WJ     Cueing 4  --  Verbal;Tactile  -WJ     Sets 4  --  2  -WJ     Reps 4  --  5  -WJ        Exercise 5    Exercise Name 5  --  seated marches on 65 CM SB  -WJ     Cueing 5  --  Verbal;Tactile;Demo  -WJ     Sets 5  --  2  -WJ     Reps 5  --  10  -WJ        Exercise 6    Exercise Name 6  --  reverse lunges with TRX straps  -WJ     Cueing 6  --  Verbal;Demo  -WJ     Sets 6  --  2  -WJ     Reps 6  --  10  -WJ        Exercise 7     Exercise Name 7  --  squats with TRX straps  -WJ     Cueing 7  --  Verbal  -WJ     Sets 7  --  2  -WJ     Reps 7  --  10  -WJ        Exercise 8    Exercise Name 8  --  hip airplanes with TRX straps  -WJ     Cueing 8  --  Verbal;Demo  -WJ     Sets 8  --  2  -WJ     Reps 8  --  10  -WJ       User Key  (r) = Recorded By, (t) = Taken By, (c) = Cosigned By    Initials Name Provider Type    Eduardo Schilling, PT DPT Physical Therapist                       PT OP Goals     Row Name 06/23/20 1515          PT Short Term Goals    STG Date to Achieve  06/04/20  -WJ     STG 1  Pt will demonstrate 20 degrees or greater passive (B) hip IR.  -WJ     STG 1 Progress  Partially Met  -WJ     STG 1 Progress Comments  L 14 deg; R 21 deg   -WJ        Long Term Goals    LTG Date to Achieve  07/04/20  -WJ     LTG 1  Pt will score 10% or less on the modified ostwestry.   -WJ     LTG 1 Progress  Met  -WJ     LTG 1 Progress Comments  6%  -WJ     LTG 2  Pt will be independent with HEP to include core strengthening and flexibility.   -WJ     LTG 2 Progress  Ongoing  -WJ     LTG 2 Progress Comments  Pt reports complaince with HEP.  -WJ     LTG 3  Pt will report pain no greater than 1-2/10 at the end of a work day.   -WJ     LTG 3 Progress  Ongoing  -WJ     LTG 3 Progress Comments  0/10 today   -        Time Calculation    PT Goal Re-Cert Due Date  07/23/20  -       User Key  (r) = Recorded By, (t) = Taken By, (c) = Cosigned By    Initials Name Provider Type    Eduardo Schilling, PT DPT Physical Therapist          Therapy Education  Given: HEP, Symptoms/condition management, Posture/body mechanics  Program: Reinforced  How Provided: Verbal, Demonstration  Provided to: Patient  Level of Understanding: Verbalized, Demonstrated    Outcome Measure Options: Francisco Orlando  Modified Oswestry  Modified Oswestry Score/Comments: 6/50      Time Calculation:   Total Timed Code Minutes- PT: 45 minute(s)      PT G-Codes  Outcome Measure Options:  Francisco Orlando  Modified Oswestry Score/Comments: 6/50         Eduardo Pedroza, PT DPT  6/23/2020

## 2020-10-08 ENCOUNTER — DOCUMENTATION (OUTPATIENT)
Dept: PHYSICAL THERAPY | Facility: CLINIC | Age: 20
End: 2020-10-08

## 2020-10-08 NOTE — PROGRESS NOTES
Outpatient Physical Therapy Discharge Summary         Patient Name: Chris Hernandez  : 2000  MRN: [id number]    Today's Date: 10/8/2020    Visit Dx:  No diagnosis found.    PT OP Goals     Row Name 10/08/20 1300          PT Short Term Goals    STG Date to Achieve  20  -WJ     STG 1  Pt will demonstrate 20 degrees or greater passive (B) hip IR.  -WJ     STG 1 Progress  Partially Met  -WJ        Long Term Goals    LTG Date to Achieve  20  -WJ     LTG 1  Pt will score 10% or less on the modified ostwestry.   -WJ     LTG 1 Progress  Met  -WJ     LTG 2  Pt will be independent with HEP to include core strengthening and flexibility.   -WJ     LTG 2 Progress  Not Met  -WJ     LTG 3  Pt will report pain no greater than 1-2/10 at the end of a work day.   -WJ     LTG 3 Progress  Not Met  -WJ       User Key  (r) = Recorded By, (t) = Taken By, (c) = Cosigned By    Initials Name Provider Type    Eduardo Schilling, PT DPT Physical Therapist          OP PT Discharge Summary  Date of Discharge: 10/08/20  Reason for Discharge: Non-compliant  Outcomes Achieved: Patient able to partially acheive established goals  Discharge Destination: Home with home program, Unknown  Discharge Instructions/Additional Comments: We had been working with Chris to address his low back pain he was responding well to therapy overall. He no showed his last few session and we have not heard from him since so will D/C at this time.      Time Calculation:                    Eduardo Pedroza PT DPT  10/8/2020

## 2021-03-17 ENCOUNTER — TRANSCRIBE ORDERS (OUTPATIENT)
Dept: ADMINISTRATIVE | Facility: HOSPITAL | Age: 21
End: 2021-03-17

## 2021-03-17 DIAGNOSIS — N50.812 TESTICULAR PAIN, LEFT: Primary | ICD-10-CM

## 2021-03-22 ENCOUNTER — HOSPITAL ENCOUNTER (OUTPATIENT)
Dept: ULTRASOUND IMAGING | Facility: HOSPITAL | Age: 21
Discharge: HOME | End: 2021-03-22
Admitting: NURSE PRACTITIONER

## 2021-03-22 DIAGNOSIS — N50.812 TESTICULAR PAIN, LEFT: ICD-10-CM

## 2021-03-22 PROCEDURE — 76870 US EXAM SCROTUM: CPT

## 2021-04-01 ENCOUNTER — OFFICE VISIT (OUTPATIENT)
Dept: UROLOGY | Facility: CLINIC | Age: 21
End: 2021-04-01

## 2021-04-01 VITALS — HEIGHT: 71 IN | BODY MASS INDEX: 44.1 KG/M2 | WEIGHT: 315 LBS | TEMPERATURE: 97.6 F

## 2021-04-01 DIAGNOSIS — N50.812 TESTICULAR PAIN, LEFT: Primary | ICD-10-CM

## 2021-04-01 DIAGNOSIS — R10.2 SUPRAPUBIC PAIN: ICD-10-CM

## 2021-04-01 LAB
BILIRUB BLD-MCNC: NEGATIVE MG/DL
CLARITY, POC: CLEAR
COLOR UR: YELLOW
GLUCOSE UR STRIP-MCNC: NEGATIVE MG/DL
KETONES UR QL: NEGATIVE
LEUKOCYTE EST, POC: NEGATIVE
NITRITE UR-MCNC: NEGATIVE MG/ML
PH UR: 5.5 [PH] (ref 5–8)
PROT UR STRIP-MCNC: ABNORMAL MG/DL
RBC # UR STRIP: NEGATIVE /UL
SP GR UR: 1.03 (ref 1–1.03)
UROBILINOGEN UR QL: NORMAL

## 2021-04-01 PROCEDURE — 99202 OFFICE O/P NEW SF 15 MIN: CPT | Performed by: PHYSICIAN ASSISTANT

## 2021-04-01 RX ORDER — NAPROXEN 500 MG/1
500 TABLET ORAL 2 TIMES DAILY WITH MEALS
Qty: 20 TABLET | Refills: 0 | Status: SHIPPED | OUTPATIENT
Start: 2021-04-01 | End: 2021-04-11

## 2021-04-07 ENCOUNTER — APPOINTMENT (OUTPATIENT)
Dept: CT IMAGING | Facility: HOSPITAL | Age: 21
End: 2021-04-07

## 2021-04-08 ENCOUNTER — APPOINTMENT (OUTPATIENT)
Dept: CT IMAGING | Facility: HOSPITAL | Age: 21
End: 2021-04-08

## 2021-04-20 ENCOUNTER — TELEPHONE (OUTPATIENT)
Dept: UROLOGY | Facility: CLINIC | Age: 21
End: 2021-04-20

## 2021-04-20 NOTE — TELEPHONE ENCOUNTER
Advised pt that [facility] had been contacted and a letter has been sent to try and get the CT approved. Pt stated understanding    ----- Message from JAVI Felipe sent at 4/20/2021  8:02 AM CDT -----  Regarding: CT  A contact patient let him know I spoke with [facility] regarding his CT scan denial and after discussion I need to fax a letter of appeal to see if we can get the CT scan covered not sure how long this will take.

## 2021-12-03 ENCOUNTER — OFFICE VISIT (OUTPATIENT)
Dept: URGENT CARE | Age: 21
End: 2021-12-03

## 2021-12-03 VITALS
BODY MASS INDEX: 44.1 KG/M2 | HEART RATE: 90 BPM | DIASTOLIC BLOOD PRESSURE: 91 MMHG | SYSTOLIC BLOOD PRESSURE: 143 MMHG | HEIGHT: 71 IN | RESPIRATION RATE: 20 BRPM | OXYGEN SATURATION: 96 % | TEMPERATURE: 97.2 F | WEIGHT: 315 LBS

## 2021-12-03 DIAGNOSIS — J06.9 UPPER RESPIRATORY TRACT INFECTION, UNSPECIFIED TYPE: Primary | ICD-10-CM

## 2021-12-03 PROCEDURE — 96372 THER/PROPH/DIAG INJ SC/IM: CPT | Performed by: PHYSICIAN ASSISTANT

## 2021-12-03 PROCEDURE — 99212 OFFICE O/P EST SF 10 MIN: CPT | Performed by: PHYSICIAN ASSISTANT

## 2021-12-03 RX ORDER — METHYLPREDNISOLONE ACETATE 80 MG/ML
80 INJECTION, SUSPENSION INTRA-ARTICULAR; INTRALESIONAL; INTRAMUSCULAR; SOFT TISSUE ONCE
Status: COMPLETED | OUTPATIENT
Start: 2021-12-03 | End: 2021-12-03

## 2021-12-03 RX ORDER — TRAZODONE HYDROCHLORIDE 100 MG/1
1 TABLET ORAL NIGHTLY
COMMUNITY

## 2021-12-03 RX ORDER — OMEGA-3 FATTY ACIDS/FISH OIL 300-1000MG
3000 CAPSULE ORAL DAILY
COMMUNITY

## 2021-12-03 RX ORDER — AMITRIPTYLINE HYDROCHLORIDE 10 MG/1
10 TABLET, FILM COATED ORAL NIGHTLY
COMMUNITY

## 2021-12-03 RX ORDER — AMOXICILLIN 500 MG/1
500 CAPSULE ORAL 3 TIMES DAILY
Qty: 21 CAPSULE | Refills: 0 | Status: SHIPPED | OUTPATIENT
Start: 2021-12-03 | End: 2021-12-03 | Stop reason: SDUPTHER

## 2021-12-03 RX ORDER — AMOXICILLIN 500 MG/1
500 CAPSULE ORAL 3 TIMES DAILY
Qty: 21 CAPSULE | Refills: 0 | Status: SHIPPED | OUTPATIENT
Start: 2021-12-03 | End: 2021-12-10

## 2021-12-03 RX ORDER — SERTRALINE HYDROCHLORIDE 100 MG/1
1 TABLET, FILM COATED ORAL DAILY
COMMUNITY

## 2021-12-03 RX ADMIN — METHYLPREDNISOLONE ACETATE 80 MG: 80 INJECTION, SUSPENSION INTRA-ARTICULAR; INTRALESIONAL; INTRAMUSCULAR; SOFT TISSUE at 16:57

## 2021-12-03 ASSESSMENT — ENCOUNTER SYMPTOMS
WHEEZING: 0
COUGH: 1
SHORTNESS OF BREATH: 0

## 2021-12-03 NOTE — PROGRESS NOTES
Subjective:      Patient ID: Chidi De León is a 19 y.o. male. HPI  Mr. Rakel Hernandez presents with a 2 week history of cough post nasal drip. He denies fever or other symptoms. Chidi De León is a 19 y.o. male with the following history as recorded in [facility]: There are no problems to display for this patient. Current Outpatient Medications   Medication Sig Dispense Refill    sertraline (ZOLOFT) 100 MG tablet Take 1 tablet by mouth daily      traZODone (DESYREL) 100 MG tablet Take 1 tablet by mouth nightly      Omega 3 1000 MG CAPS Take 3,000 mg by mouth daily      amitriptyline (ELAVIL) 10 MG tablet Take 10 mg by mouth nightly      amoxicillin (AMOXIL) 500 MG capsule Take 1 capsule by mouth 3 times daily for 7 days 21 capsule 0     Current Facility-Administered Medications   Medication Dose Route Frequency Provider Last Rate Last Admin    methylPREDNISolone acetate (DEPO-MEDROL) injection 80 mg  80 mg IntraMUSCular Once Radha Gudino PA-C         Allergies: Patient has no known allergies. Past Medical History:   Diagnosis Date    Anxiety     Depression     PTSD (post-traumatic stress disorder)     SVT (supraventricular tachycardia) ([facility])      Past Surgical History:   Procedure Laterality Date    APPENDECTOMY       No family history on file. Social History     Tobacco Use    Smoking status: Never Smoker    Smokeless tobacco: Former User   Substance Use Topics    Alcohol use: Yes     Comment: RARELY       Review of Systems   Constitutional: Negative for fever. HENT: Positive for congestion and postnasal drip. Respiratory: Positive for cough. Negative for shortness of breath and wheezing. All other systems reviewed and are negative. Objective:   Physical Exam  Constitutional:       Appearance: He is ill-appearing. HENT:      Mouth/Throat:      Mouth: Mucous membranes are moist.      Pharynx: Posterior oropharyngeal erythema present.       Comments: Post nasal drip  Cardiovascular: Rate and Rhythm: Normal rate and regular rhythm. Pulmonary:      Effort: Pulmonary effort is normal.      Breath sounds: Normal breath sounds. Neurological:      General: No focal deficit present. Mental Status: He is alert and oriented to person, place, and time. Psychiatric:         Mood and Affect: Mood normal.         Behavior: Behavior normal.         Thought Content: Thought content normal.         Judgment: Judgment normal.         Assessment:      URI      Plan:      He was given a steroid shot while in the clinic. I prescribed amoxicillin. He will push fluids. I advised him to return if his symptoms worsened or do not improve.           Makayla Sanchez PA-C

## 2022-03-24 ENCOUNTER — HOSPITAL ENCOUNTER (OUTPATIENT)
Dept: CT IMAGING | Age: 22
Discharge: HOME | End: 2022-03-24
Payer: OTHER GOVERNMENT

## 2022-03-24 DIAGNOSIS — Z01.89 ENCOUNTER FOR OTHER SPECIFIED SPECIAL EXAMINATIONS: ICD-10-CM

## 2022-03-24 PROCEDURE — 70450 CT HEAD/BRAIN W/O DYE: CPT

## 2022-06-11 ENCOUNTER — OFFICE VISIT (OUTPATIENT)
Age: 22
End: 2022-06-11
Payer: OTHER GOVERNMENT

## 2022-06-11 VITALS
OXYGEN SATURATION: 96 % | HEART RATE: 105 BPM | RESPIRATION RATE: 22 BRPM | TEMPERATURE: 100.2 F | WEIGHT: 315 LBS | BODY MASS INDEX: 44.1 KG/M2 | SYSTOLIC BLOOD PRESSURE: 130 MMHG | HEIGHT: 71 IN | DIASTOLIC BLOOD PRESSURE: 75 MMHG

## 2022-06-11 DIAGNOSIS — Z20.822 EXPOSURE TO COVID-19 VIRUS: Primary | ICD-10-CM

## 2022-06-11 PROCEDURE — 99212 OFFICE O/P EST SF 10 MIN: CPT | Performed by: PHYSICIAN ASSISTANT

## 2022-06-11 NOTE — PROGRESS NOTES
Silvana Henry presents to the clinic today requesting COVID-19 testing. He complains of fever, headache and body aches. He has had positive exposure. On exam, patient appears in no acute distress. Speech is clear. Breathing is unlabored. Moves all extremities and is ambulatory. We will order a COVID test today to be performed in clinic. The patient and appropriate authorities will be informed of the results. He should quarantine at home until results are returned. If he tests positive, he should quarantine for a total of 10 days after symptoms began and 24 hours after fever resolves without fever reducing medications per CDC guidelines. Patient was advised that even if asymptomatic, after a positive result he should quarantine for 10 days per [facility] guidelines. Patient left in stable condition. Total of 15 minutes spent, which includes face to face with patient, record review, evaluation, planning, and education as well as coordination of his care.

## 2022-06-13 LAB — SARS-COV-2, PCR: NOT DETECTED

## 2022-07-06 ENCOUNTER — OFFICE VISIT (OUTPATIENT)
Age: 22
End: 2022-07-06
Payer: OTHER GOVERNMENT

## 2022-07-06 VITALS
BODY MASS INDEX: 44.1 KG/M2 | HEIGHT: 71 IN | SYSTOLIC BLOOD PRESSURE: 122 MMHG | HEART RATE: 68 BPM | WEIGHT: 315 LBS | TEMPERATURE: 98.4 F | RESPIRATION RATE: 18 BRPM | DIASTOLIC BLOOD PRESSURE: 74 MMHG | OXYGEN SATURATION: 98 %

## 2022-07-06 DIAGNOSIS — H60.331 ACUTE SWIMMER'S EAR OF RIGHT SIDE: Primary | ICD-10-CM

## 2022-07-06 PROCEDURE — 99213 OFFICE O/P EST LOW 20 MIN: CPT | Performed by: NURSE PRACTITIONER

## 2022-07-06 RX ORDER — CIPROFLOXACIN AND DEXAMETHASONE 3; 1 MG/ML; MG/ML
4 SUSPENSION/ DROPS AURICULAR (OTIC) 2 TIMES DAILY
Qty: 7.5 ML | Refills: 0 | Status: SHIPPED | OUTPATIENT
Start: 2022-07-06 | End: 2022-07-13

## 2022-07-06 ASSESSMENT — ENCOUNTER SYMPTOMS: SORE THROAT: 0

## 2022-07-06 NOTE — PROGRESS NOTES
[phone]  [address] [facility] [address] [id number]  Dept: [phone]  Dept Fax: [phone]  Loc: [phone]    Marcos Saba is a 23 y.o. male who presents today for his medical conditions/complaintsas noted below. Marcos Saba is c/o of Hearing Loss (Right ear), Ear Fullness, and Otalgia        HPI:     Otalgia   There is pain in the right ear. This is a new problem. The current episode started yesterday. The problem occurs constantly. The problem has been rapidly worsening. There has been no fever. The pain is severe. Associated symptoms include hearing loss (decreased hearing). Pertinent negatives include no ear discharge, headaches or sore throat. He has tried acetaminophen and NSAIDs (steam shower) for the symptoms. The treatment provided no relief. Has been swimming      Past Medical History:   Diagnosis Date    Anxiety     Depression     PTSD (post-traumatic stress disorder)     SVT (supraventricular tachycardia) ([facility])      Past Surgical History:   Procedure Laterality Date    APPENDECTOMY         No family history on file. Social History     Tobacco Use    Smoking status: Never Smoker    Smokeless tobacco: Former User   Substance Use Topics    Alcohol use: Yes     Comment: RARELY      Current Outpatient Medications   Medication Sig Dispense Refill    ciprofloxacin-dexamethasone (CIPRODEX) 0.3-0.1 % otic suspension Place 4 drops into the right ear 2 times daily for 7 days 7.5 mL 0    sertraline (ZOLOFT) 100 MG tablet Take 1 tablet by mouth daily      traZODone (DESYREL) 100 MG tablet Take 1 tablet by mouth nightly      Omega 3 1000 MG CAPS Take 3,000 mg by mouth daily      amitriptyline (ELAVIL) 10 MG tablet Take 10 mg by mouth nightly       No current facility-administered medications for this visit.      No Known Allergies    Health Maintenance   Topic Date Due    Varicella vaccine (1 of 2 - 2-dose childhood series) Never done    HPV vaccine (1 - Male 2-dose series) Never done    Depression Screen  Never done    HIV screen  Never done    Hepatitis C screen  Never done    DTaP/Tdap/Td vaccine (1 - Tdap) Never done    COVID-19 Vaccine (3 - Booster for Pfizer series) 05/30/2022    Flu vaccine (1) 09/01/2022    Hepatitis A vaccine  Aged Out    Hepatitis B vaccine  Aged Out    Hib vaccine  Aged Out    Meningococcal (ACWY) vaccine  Aged Out    Pneumococcal 0-64 years Vaccine  Aged Out       Subjective:     Review of Systems   Constitutional: Negative for chills and fever. HENT: Positive for ear pain and hearing loss (decreased hearing). Negative for ear discharge and sore throat. Neurological: Negative for headaches. All other systems reviewed and are negative.      :Objective      Physical Exam  Vitals and nursing note reviewed. Constitutional:       General: He is not in acute distress. Appearance: Normal appearance. He is well-developed. He is not ill-appearing or diaphoretic. HENT:      Head: Normocephalic and atraumatic. Right Ear: Drainage (thick brown yellow drainage in the canal and pressed against the TM) and swelling present. Left Ear: Tympanic membrane, ear canal and external ear normal.      Ears:      Comments: Erythema in the right canal      Nose: Nose normal.   Eyes:      Conjunctiva/sclera: Conjunctivae normal.      Pupils: Pupils are equal, round, and reactive to light. Pulmonary:      Effort: Pulmonary effort is normal. No respiratory distress. Skin:     General: Skin is warm and dry. Findings: No rash. Neurological:      Mental Status: He is alert and oriented to person, place, and time. Psychiatric:         Mood and Affect: Mood normal.         Behavior: Behavior normal.       /74   Pulse 68   Temp 98.4 °F (36.9 °C) (Temporal)   Resp 18   Ht 5' 11\" (1.803 m)   Wt (!) 323 lb (146.5 kg)   SpO2 98%   BMI 45.05 kg/m²     :Assessment       Diagnosis Orders   1. coated with petroleum jelly as an earplug. Do not use plastic earplugs.  Use a hair dryer set on low to carefully dry the ear after you shower.  To ease ear pain, hold a warm washcloth against your ear.  Take pain medicines exactly as directed. ? If the doctor gave you a prescription medicine for pain, take it as prescribed. ? If you are not taking a prescription pain medicine, ask your doctor if you can take an over-the-counter medicine. Inserting ear drops   Warm the drops to body temperature by rolling the container in your hands. Or you can place it in a cup of warm water for a few minutes.  Lie down, with your ear facing up.  Place drops inside the ear. Follow your doctor's instructions (or the directions on the label) for how many drops to use. Gently wiggle the outer ear or pull the ear up and back to help the drops get into the ear.  It's important to keep the liquid in the ear canal for 3 to 5 minutes. When should you call for help? Call your doctor now or seek immediate medical care if:     You have a new or higher fever.      You have new or worse pain, swelling, warmth, or redness around or behind your ear.      You have new or increasing pus or blood draining from your ear. Watch closely for changes in your health, and be sure to contact your doctor if:     You are not getting better after 2 days (48 hours). Where can you learn more? Go to https://[email or web address].com. org and sign in to your silkfred account. Enter C706 in the [facility] box to learn more about \"Swimmer's Ear: Care Instructions. \"     If you do not have an account, please click on the \"Sign Up Now\" link. Current as of: September 8, 2021               Content Version: 13.3  © 2653-8881 Healthwise, Incorporated. Care instructions adapted under license by [facility] ([facility]).  If you have questions about a medical condition or this instruction, always ask your healthcare professional. [employer] disclaims any warranty or liability for your use of this information. 1. Ear drops as prescribed   2. If patient is not improving or developing any new/worsening symptoms then return to clinic as needed. Patient is to follow up with PCP as needed.            Electronically signed by TADEO Lowe on 7/6/2022 at 3:43 PM

## 2022-07-06 NOTE — PATIENT INSTRUCTIONS
Patient Education        Swimmer's Ear: Care Instructions  Your Care Instructions     Swimmer's ear (otitis externa) is inflammation or infection of the ear canal. This is the passage that leads from the outer ear to the eardrum. Any water, sand, or other debris that gets into the ear canal and stays there can cause swimmer's ear. Putting cotton swabs or other items in the ear to clean it canalso cause this problem. Swimmer's ear can be very painful. But you can treat the pain and infectionwith medicines. You should feel better in a few days. Follow-up care is a key part of your treatment and safety. Be sure to make and go to all appointments, and call your doctor if you are having problems. It's also a good idea to know your test results and keep alist of the medicines you take. How can you care for yourself at home? Cleaning and care   Use antibiotic drops as your doctor directs.  Do not insert ear drops (other than the antibiotic ear drops) or anything else into the ear unless your doctor has told you to.  Avoid getting water in the ear until the problem clears up. Use cotton lightly coated with petroleum jelly as an earplug. Do not use plastic earplugs.  Use a hair dryer set on low to carefully dry the ear after you shower.  To ease ear pain, hold a warm washcloth against your ear.  Take pain medicines exactly as directed. ? If the doctor gave you a prescription medicine for pain, take it as prescribed. ? If you are not taking a prescription pain medicine, ask your doctor if you can take an over-the-counter medicine. Inserting ear drops   Warm the drops to body temperature by rolling the container in your hands. Or you can place it in a cup of warm water for a few minutes.  Lie down, with your ear facing up.  Place drops inside the ear. Follow your doctor's instructions (or the directions on the label) for how many drops to use.  Gently wiggle the outer ear or pull the ear up and back to help the drops get into the ear.  It's important to keep the liquid in the ear canal for 3 to 5 minutes. When should you call for help? Call your doctor now or seek immediate medical care if:     You have a new or higher fever.      You have new or worse pain, swelling, warmth, or redness around or behind your ear.      You have new or increasing pus or blood draining from your ear. Watch closely for changes in your health, and be sure to contact your doctor if:     You are not getting better after 2 days (48 hours). Where can you learn more? Go to https://[employer].trip.me. org and sign in to your cube19 account. Enter C706 in the [employer] box to learn more about \"Swimmer's Ear: Care Instructions. \"     If you do not have an account, please click on the \"Sign Up Now\" link. Current as of: September 8, 2021               Content Version: 13.3  © 2006-2022 Healthwise, [facility]. Care instructions adapted under license by [facility] ([facility]). If you have questions about a medical condition or this instruction, always ask your healthcare professional. [place] [address] any warranty or liability for your use of this information. 1. Ear drops as prescribed   2. If patient is not improving or developing any new/worsening symptoms then return to clinic as needed. Patient is to follow up with PCP as needed.

## 2022-07-07 ENCOUNTER — TELEPHONE (OUTPATIENT)
Age: 22
End: 2022-07-07

## 2022-07-07 ENCOUNTER — NURSE TRIAGE (OUTPATIENT)
Dept: OTHER | Facility: CLINIC | Age: 22
End: 2022-07-07

## 2022-07-07 ENCOUNTER — HOSPITAL ENCOUNTER (EMERGENCY)
Age: 22
Discharge: HOME | End: 2022-07-07
Payer: OTHER GOVERNMENT

## 2022-07-07 VITALS
WEIGHT: 315 LBS | SYSTOLIC BLOOD PRESSURE: 144 MMHG | RESPIRATION RATE: 17 BRPM | HEIGHT: 71 IN | BODY MASS INDEX: 44.1 KG/M2 | DIASTOLIC BLOOD PRESSURE: 84 MMHG | TEMPERATURE: 98.2 F | OXYGEN SATURATION: 97 % | HEART RATE: 79 BPM

## 2022-07-07 DIAGNOSIS — H60.391 INFECTIVE OTITIS EXTERNA OF RIGHT EAR: Primary | ICD-10-CM

## 2022-07-07 PROCEDURE — 99283 EMERGENCY DEPT VISIT LOW MDM: CPT

## 2022-07-07 RX ORDER — LEVOFLOXACIN 500 MG/1
500 TABLET, FILM COATED ORAL DAILY
Qty: 7 TABLET | Refills: 0 | Status: SHIPPED | OUTPATIENT
Start: 2022-07-07 | End: 2022-07-14

## 2022-07-07 ASSESSMENT — ENCOUNTER SYMPTOMS
CHOKING: 0
FACIAL SWELLING: 1
COUGH: 0

## 2022-07-07 ASSESSMENT — PAIN SCALES - GENERAL
PAINLEVEL_OUTOF10: 9
PAINLEVEL_OUTOF10: 6

## 2022-07-07 ASSESSMENT — PAIN DESCRIPTION - DESCRIPTORS: DESCRIPTORS: SHARP

## 2022-07-07 ASSESSMENT — PAIN - FUNCTIONAL ASSESSMENT
PAIN_FUNCTIONAL_ASSESSMENT: 0-10
PAIN_FUNCTIONAL_ASSESSMENT: 0-10

## 2022-07-07 ASSESSMENT — PAIN DESCRIPTION - LOCATION: LOCATION: EAR;JAW

## 2022-07-07 ASSESSMENT — PAIN DESCRIPTION - ORIENTATION: ORIENTATION: RIGHT

## 2022-07-07 NOTE — Clinical Note
Samantha Isidro was seen and treated in our emergency department on 7/7/2022. He may return to work on 07/10/2022. If you have any questions or concerns, please don't hesitate to call.       Vicki Rodriguez

## 2022-07-07 NOTE — TELEPHONE ENCOUNTER
Limited triage as patient is not with caller. Subjective: Caller, patient's wife, states \"My  was seen at the [facility] yesterday and diagnosed with a horrible case of swimmers ear. It's very infection and there is a lot of pus. He has lost his hearing in that ear and the pain is absolutely horrible. He has now developed some pretty bad swelling that runs from his R ear to his jaw and cheek. They told us if he got worse to go back to [facility] or to the ER. I messaged his PCP earlier and she said if he is getting worse he needs to seek further treatment. Current Symptoms: R ear pain    Onset: 3 days    Associated Symptoms: drainage, loss of hearing, neck and facial swelling    Pain Severity: very painful    Temperature: denies fever but is taking antipyretics    What has been tried: tylenol, advil, cipro    LMP: NA Pregnant: NA    Recommended disposition: Go to ED Now    Care advice provided, patient verbalizes understanding; denies any other questions or concerns; instructed to call back for any new or worsening symptoms. Patient/caller agrees to proceed to nearest Emergency Department    Attention Provider: Thank you for allowing me to participate in the care of your patient. The patient was connected to triage in response to symptoms provided. Please do not respond through this encounter as the response is not directed to a shared pool.     Reason for Disposition   Patient sounds very sick or weak to the triager    Protocols used: EAR - OTITIS EXTERNA FOLLOW-UP CALL-ADULT-

## 2022-07-07 NOTE — TELEPHONE ENCOUNTER
Patient's wife called stating that her , Samantha Isidro ear pain wasn't getting any better. I spoke with the [facility], who informed me to tell the patient that it will take a little more time taking the antibotics before he starts to feel better, But if he feels that he is getting worse he needs t5o seek further treatment. Toña Verdin, voiced understanding.

## 2022-07-08 NOTE — ED PROVIDER NOTES
[facility] EMERGENCY DEPT  eMERGENCY dEPARTMENT eNCOUnter      Pt Name: Lani Barrett  MRN: [id number]  [place] 2000  Date of evaluation: 7/7/2022  [facility]       Chief Complaint   Patient presents with    Otalgia     Pt arrived to ED with c/o right ear pain and hearing loss. Pt states he was dx with swimmers ear yesterday. Pt states he woke up with worsening ear pain and was advised chen BUTLER to come to ED         HISTORY OF PRESENT ILLNESS   (Location/Symptom, Timing/Onset,Context/Setting, Quality, Duration, Modifying Factors, Severity)  Note limiting factors. Lani Barrett is a 23 y.o. male with history of anxiety, depression, PTSD, and SVT who presents to the emergency department with complaint of right ear pain. He states he was in the pool and got water in the ear. He went to urgent care yesterday and received topial Cipro ear drops. He states he has been using them as prescribed. He states today that the pain feels like it is worsening and spreading down outside around the ear. He states he has had swelling around the ear and face. He went back to the urgent care but they sent him to the ER. He denies any fever or chills. He does note pain that radiates to his neck but denies any stiffness. He denies any associated URI symptoms of congestion or cough. HPI    NursingNotes were reviewed. REVIEW OF SYSTEMS    (2-9 systems for level 4, 10 or more for level 5)     Review of Systems   Constitutional: Negative for chills and fever. HENT: Positive for ear pain and facial swelling. Negative for congestion. Respiratory: Negative for cough and choking. Cardiovascular: Negative for chest pain. Neurological: Negative for light-headedness and headaches. All other systems reviewed and are negative.            PAST MEDICALHISTORY     Past Medical History:   Diagnosis Date    Anxiety     Depression     PTSD (post-traumatic stress disorder)     [facility]          SURGICAL HISTORY       Past Surgical History:   Procedure Laterality Date    APPENDECTOMY           CURRENT MEDICATIONS     Discharge Medication List as of 7/7/2022  7:53 PM      CONTINUE these medications which have NOT CHANGED    Details   ciprofloxacin-dexamethasone (CIPRODEX) 0.3-0.1 % otic suspension Place 4 drops into the right ear 2 times daily for 7 days, Disp-7.5 mL, R-0Normal      sertraline (ZOLOFT) 100 MG tablet Take 1 tablet by mouth dailyHistorical Med      traZODone (DESYREL) 100 MG tablet Take 1 tablet by mouth nightlyHistorical Med      Omega 3 1000 MG CAPS Take 3,000 mg by mouth dailyHistorical Med      amitriptyline (ELAVIL) 10 MG tablet Take 10 mg by mouth nightlyHistorical Med             ALLERGIES     Patient has no known allergies. FAMILY HISTORY     History reviewed. No pertinent family history. SOCIAL HISTORY       Social History     Socioeconomic History    Marital status:      Spouse name: None    Number of children: None    Years of education: None    Highest education level: None   Occupational History    None   Tobacco Use    Smoking status: Never Smoker    Smokeless tobacco: Former User   Vaping Use    Vaping Use: Never used   Substance and Sexual Activity    Alcohol use: Yes     Comment: RARELY    Drug use: No    Sexual activity: None   Other Topics Concern    None   Social History Narrative    None     Social Determinants of Health     Financial Resource Strain:     Difficulty of Paying Living Expenses: Not on file   Food Insecurity:     Worried About Running Out of Food in the Last Year: Not on file    Vivian of Food in the Last Year: Not on file   Transportation Needs:     Lack of Transportation (Medical): Not on file    Lack of Transportation (Non-Medical):  Not on file   Physical Activity:     Days of Exercise per Week: Not on file    Minutes of Exercise per Session: Not on file   Stress:     Feeling of Stress : Not on file   Social Connections:     Frequency of Communication with Friends and Family: Not on file    Frequency of Social Gatherings with Friends and Family: Not on file    Attends [employer] Services: Not on file    Active Member of Clubs or Organizations: Not on file    Attends Club or Organization Meetings: Not on file    Marital Status: Not on file   Intimate Partner Violence:     Fear of Current or Ex-Partner: Not on file    Emotionally Abused: Not on file    Physically Abused: Not on file    Sexually Abused: Not on file   Housing Stability:     Unable to Pay for Housing in the Last Year: Not on file    Number of [place] in the Last Year: Not on file    Unstable Housing in the Last Year: Not on file       SCREENINGS    Geena Coma Scale  Eye Opening: Spontaneous  Best Verbal Response: Oriented  Best Motor Response: Obeys commands  [place] Coma Scale Score: 15        PHYSICAL EXAM    (up to 7 for level 4, 8 or more for level 5)     ED Triage Vitals [07/07/22 1837]   BP Temp Temp Source Heart Rate Resp SpO2 Height Weight   (!) 141/96 98.2 °F (36.8 °C) Oral 79 17 97 % 5' 11\" (1.803 m) (!) 321 lb (145.6 kg)       Physical Exam  Vitals and nursing note reviewed. Constitutional:       General: He is not in acute distress. Appearance: Normal appearance. He is normal weight. He is not ill-appearing, toxic-appearing or diaphoretic. HENT:      Head: Normocephalic and atraumatic. Right Ear: External ear normal. Swelling and tenderness present. No drainage. No foreign body. No mastoid tenderness. Tympanic membrane is erythematous. Tympanic membrane is not bulging. Left Ear: Tympanic membrane, ear canal and external ear normal.      Ears:      Comments: Otitis externa with swelling, redness, and increased fluid in the external auditory canal. There is redness of TM without bulging. There is no associated redness of the external ear. No pre or postauricular lymphadenopathy noted.   No cervical adenopathy noted. Nose: Nose normal. No congestion or rhinorrhea. Mouth/Throat:      Mouth: Mucous membranes are moist.      Pharynx: Oropharynx is clear. No oropharyngeal exudate or posterior oropharyngeal erythema. Eyes:      Extraocular Movements: Extraocular movements intact. Pupils: Pupils are equal, round, and reactive to light. Cardiovascular:      Rate and Rhythm: Normal rate and regular rhythm. Pulses: Normal pulses. Pulmonary:      Effort: Pulmonary effort is normal. No respiratory distress. Chest:      Chest wall: No tenderness. Musculoskeletal:      Cervical back: Normal range of motion and neck supple. No rigidity or tenderness. Lymphadenopathy:      Cervical: No cervical adenopathy. Neurological:      Mental Status: He is alert. DIAGNOSTIC RESULTS     LABS:  Labs Reviewed - No data to display    All other labs were within normal range or not returned as of this dictation. EMERGENCY DEPARTMENT COURSE and DIFFERENTIAL DIAGNOSIS/MDM:   Vitals:    Vitals:    07/07/22 1837 07/07/22 1950   BP: (!) 141/96 (!) 144/84   Pulse: 79    Resp: 17    Temp: 98.2 °F (36.8 °C)    TempSrc: Oral    SpO2: 97%    Weight: (!) 321 lb (145.6 kg)    Height: 5' 11\" (1.803 m)        MDM  Patient is a 64-year-old male presents the ER with right ear pain that is worsened even after using topical antibiotic. On physical exam, he does not have external ear abnormality including redness, swelling, or drainage. However, he does have tenderness noted of the pinna. He does not have any surrounding lymphadenopathy that is concerning. He also does not have signs of mastoiditis that would warrant imaging. He does not have signs of sepsis on his vital signs that would warrant further work-up as well. I have discussed plan to start him on oral antibiotics to help with symptom control. He is agreeable to this treatment plan.   Return precautions were given to him and he verbalized understanding. All questions were answered. He is agreeable to treatment plan. FINAL IMPRESSION      1. Infective otitis externa of right ear          DISPOSITION/PLAN   DISPOSITION Decision To Discharge 07/07/2022 07:23:27 PM      PATIENT REFERRED TO:  [facility]  [address] [facility].   [address] [place] [id number]  Cindy Miller MD  [address] [phone]            DISCHARGE MEDICATIONS:  Discharge Medication List as of 7/7/2022  7:53 PM      START taking these medications    Details   levoFLOXacin (LEVAQUIN) 500 MG tablet Take 1 tablet by mouth daily for 7 days, Disp-7 tablet, R-0Normal                (Please note that portions of this note were completed with a voice recognition program.  Efforts were made to edit thedictations but occasionally words are mis-transcribed.)    PARVIZ Rodriguez (electronically signed)     Vicki Rodriguez  07/07/22 2042

## 2022-10-18 ENCOUNTER — OFFICE VISIT (OUTPATIENT)
Dept: PRIMARY CARE CLINIC | Age: 22
End: 2022-10-18
Payer: COMMERCIAL

## 2022-10-18 VITALS
HEIGHT: 71 IN | HEART RATE: 76 BPM | DIASTOLIC BLOOD PRESSURE: 86 MMHG | WEIGHT: 315 LBS | RESPIRATION RATE: 16 BRPM | SYSTOLIC BLOOD PRESSURE: 122 MMHG | OXYGEN SATURATION: 97 % | TEMPERATURE: 97.7 F | BODY MASS INDEX: 44.1 KG/M2

## 2022-10-18 DIAGNOSIS — Z00.00 ANNUAL PHYSICAL EXAM: ICD-10-CM

## 2022-10-18 DIAGNOSIS — T73.3XXA FATIGUE DUE TO EXCESSIVE EXERTION, INITIAL ENCOUNTER: Primary | ICD-10-CM

## 2022-10-18 DIAGNOSIS — E78.2 MODERATE MIXED HYPERLIPIDEMIA NOT REQUIRING STATIN THERAPY: ICD-10-CM

## 2022-10-18 DIAGNOSIS — G47.10 HYPERSOMNIA: ICD-10-CM

## 2022-10-18 DIAGNOSIS — Z23 NEED FOR INFLUENZA VACCINATION: ICD-10-CM

## 2022-10-18 LAB
ALBUMIN SERPL-MCNC: 4.7 G/DL (ref 3.5–5.2)
ALP BLD-CCNC: 89 U/L (ref 40–130)
ALT SERPL-CCNC: 22 U/L (ref 5–41)
ANION GAP SERPL CALCULATED.3IONS-SCNC: 8 MMOL/L (ref 7–19)
AST SERPL-CCNC: 19 U/L (ref 5–40)
BASOPHILS ABSOLUTE: 0.1 K/UL (ref 0–0.2)
BASOPHILS RELATIVE PERCENT: 0.7 % (ref 0–1)
BILIRUB SERPL-MCNC: 0.5 MG/DL (ref 0.2–1.2)
BUN BLDV-MCNC: 15 MG/DL (ref 6–20)
CALCIUM SERPL-MCNC: 9.8 MG/DL (ref 8.6–10)
CHLORIDE BLD-SCNC: 106 MMOL/L (ref 98–111)
CHOLESTEROL, TOTAL: 229 MG/DL (ref 160–199)
CO2: 26 MMOL/L (ref 22–29)
CREAT SERPL-MCNC: 0.8 MG/DL (ref 0.5–1.2)
EOSINOPHILS ABSOLUTE: 0.3 K/UL (ref 0–0.6)
EOSINOPHILS RELATIVE PERCENT: 3.5 % (ref 0–5)
GFR SERPL CREATININE-BSD FRML MDRD: >60 ML/MIN/{1.73_M2}
GLUCOSE BLD-MCNC: 87 MG/DL (ref 74–109)
HCT VFR BLD CALC: 47.1 % (ref 42–52)
HDLC SERPL-MCNC: 34 MG/DL (ref 55–121)
HEMOGLOBIN: 15.5 G/DL (ref 14–18)
IMMATURE GRANULOCYTES #: 0 K/UL
LDL CHOLESTEROL CALCULATED: 157 MG/DL
LYMPHOCYTES ABSOLUTE: 2.8 K/UL (ref 1.1–4.5)
LYMPHOCYTES RELATIVE PERCENT: 38.9 % (ref 20–40)
MCH RBC QN AUTO: 27.4 PG (ref 27–31)
MCHC RBC AUTO-ENTMCNC: 32.9 G/DL (ref 33–37)
MCV RBC AUTO: 83.4 FL (ref 80–94)
MONOCYTES ABSOLUTE: 0.4 K/UL (ref 0–0.9)
MONOCYTES RELATIVE PERCENT: 6.1 % (ref 0–10)
NEUTROPHILS ABSOLUTE: 3.6 K/UL (ref 1.5–7.5)
NEUTROPHILS RELATIVE PERCENT: 50.4 % (ref 50–65)
PDW BLD-RTO: 13.6 % (ref 11.5–14.5)
PLATELET # BLD: 222 K/UL (ref 130–400)
PMV BLD AUTO: 11 FL (ref 9.4–12.4)
POTASSIUM SERPL-SCNC: 4.3 MMOL/L (ref 3.5–5)
RBC # BLD: 5.65 M/UL (ref 4.7–6.1)
SODIUM BLD-SCNC: 140 MMOL/L (ref 136–145)
TOTAL PROTEIN: 7.7 G/DL (ref 6.6–8.7)
TRIGL SERPL-MCNC: 189 MG/DL (ref 0–149)
WBC # BLD: 7.2 K/UL (ref 4.8–10.8)

## 2022-10-18 PROCEDURE — 90674 CCIIV4 VAC NO PRSV 0.5 ML IM: CPT | Performed by: FAMILY MEDICINE

## 2022-10-18 PROCEDURE — 99214 OFFICE O/P EST MOD 30 MIN: CPT | Performed by: FAMILY MEDICINE

## 2022-10-18 PROCEDURE — 90471 IMMUNIZATION ADMIN: CPT | Performed by: FAMILY MEDICINE

## 2022-10-18 RX ORDER — SUMATRIPTAN 25 MG/1
25 TABLET, FILM COATED ORAL
Qty: 27 TABLET | Refills: 1 | Status: SHIPPED | OUTPATIENT
Start: 2022-10-18 | End: 2022-10-18

## 2022-10-18 SDOH — ECONOMIC STABILITY: FOOD INSECURITY: WITHIN THE PAST 12 MONTHS, YOU WORRIED THAT YOUR FOOD WOULD RUN OUT BEFORE YOU GOT MONEY TO BUY MORE.: NEVER TRUE

## 2022-10-18 SDOH — ECONOMIC STABILITY: FOOD INSECURITY: WITHIN THE PAST 12 MONTHS, THE FOOD YOU BOUGHT JUST DIDN'T LAST AND YOU DIDN'T HAVE MONEY TO GET MORE.: NEVER TRUE

## 2022-10-18 ASSESSMENT — ENCOUNTER SYMPTOMS
NAUSEA: 0
CHEST TIGHTNESS: 0
DIARRHEA: 0
BLOOD IN STOOL: 0
ABDOMINAL PAIN: 0
SHORTNESS OF BREATH: 0
WHEEZING: 0

## 2022-10-18 ASSESSMENT — PATIENT HEALTH QUESTIONNAIRE - PHQ9
SUM OF ALL RESPONSES TO PHQ QUESTIONS 1-9: 1
1. LITTLE INTEREST OR PLEASURE IN DOING THINGS: 1
SUM OF ALL RESPONSES TO PHQ QUESTIONS 1-9: 1
SUM OF ALL RESPONSES TO PHQ9 QUESTIONS 1 & 2: 1
SUM OF ALL RESPONSES TO PHQ QUESTIONS 1-9: 1
SUM OF ALL RESPONSES TO PHQ QUESTIONS 1-9: 1
2. FEELING DOWN, DEPRESSED OR HOPELESS: 0

## 2022-10-18 ASSESSMENT — SOCIAL DETERMINANTS OF HEALTH (SDOH): HOW HARD IS IT FOR YOU TO PAY FOR THE VERY BASICS LIKE FOOD, HOUSING, MEDICAL CARE, AND HEATING?: NOT HARD AT ALL

## 2022-10-18 NOTE — PROGRESS NOTES
Landrum Holstein (:  2000) is a 23 y.o. male,New patient, here for evaluation of the following chief complaint(s):  New Patient (Here to establish, wife is concerned about his sleeping and snoring, She says he sounds like he is choking while he is sleeping. Going on for atleast 4 years, Does feel fatigued a lot but also has PTSD and depression from serving I the [employer])         ASSESSMENT/PLAN:  1. Fatigue due to excessive exertion, initial encounter  -     Home Sleep Study; Future  2. Need for influenza vaccination  -     Influenza, FLUCELVAX, (age 10 mo+), IM, PF, 0.5 mL  3. Annual physical exam  -     CBC with Auto Differential; Future  -     Comprehensive Metabolic Panel; Future  4. Moderate mixed hyperlipidemia not requiring statin therapy  -     Lipid Panel; Future  5. Hypersomnia      Given high [place] Sleepiness Scale and persistent fatigue symptoms will obtain home polysomnography at this time. Will call patient with results and have follow-up with sleep medicine as indicated  Will also initiate treatment with Imitrex 25 mg as needed for acute migraine. Patient encouraged to continue amitriptyline this time for preventative treatment. Risk and benefit of medication discussed with patient. Will obtain baseline labs at this time and have patient return in 6 months for monitoring of PTSD and blood pressure. Return in about 6 months (around 2023). Subjective   SUBJECTIVE/OBJECTIVE:  Landrum Holstein is a 23 y.o. male who presents to establish care and due to concerns about ongoing fatigue and possible sleep apnea. Patient says that he is fatigued throughout the day every day and this has been ongoing for around 4 years. Patient's wife has told him that he snores loudly. [place] Sleepiness Scale score of 21  Patient has history of migraines and states that he has 1 about once or twice every month. He takes amitriptyline as a preventative but does not use any sort of abortive treatment. Patient says that when the onset he typically has pain behind his eyes that then turns into a one-sided migraine. Patient also has a history of hyperlipidemia which he tries to control with diet. Patient says he carefully avoids fatty foods and is trying to lose weight at this time        Review of Systems   Constitutional:  Positive for fatigue. Negative for activity change and fever. HENT:  Negative for congestion. Eyes:  Negative for visual disturbance. Respiratory:  Negative for chest tightness, shortness of breath and wheezing. Cardiovascular:  Negative for chest pain. Gastrointestinal:  Negative for abdominal pain, blood in stool, diarrhea and nausea. Genitourinary:  Negative for difficulty urinating and urgency. Neurological:  Positive for headaches. Negative for weakness. Psychiatric/Behavioral:  Negative for confusion. Objective   Physical Exam  Vitals reviewed. Constitutional:       General: He is not in acute distress. Appearance: Normal appearance. He is not ill-appearing. HENT:      Head: Normocephalic and atraumatic. Mouth/Throat:      Mouth: Mucous membranes are moist.      Palate: No lesions. Pharynx: No posterior oropharyngeal erythema. Tonsils: No tonsillar exudate. Comments: Mallampati 3  Cardiovascular:      Rate and Rhythm: Normal rate and regular rhythm. Pulses: Normal pulses. Heart sounds: Normal heart sounds. No murmur heard. Pulmonary:      Effort: Pulmonary effort is normal. No respiratory distress. Breath sounds: Normal breath sounds. No wheezing or rhonchi. Chest:      Chest wall: No tenderness. Abdominal:      General: Abdomen is flat. Bowel sounds are normal. There is no distension. Palpations: Abdomen is soft. Tenderness: There is no abdominal tenderness. Musculoskeletal:         General: No swelling. Skin:     General: Skin is warm and dry. Neurological:      General: No focal deficit present. Mental Status: He is alert. Vitals:    10/18/22 1345   BP: 122/86   Pulse: 76   Resp: 16   Temp: 97.7 °F (36.5 °C)   SpO2: 97%        Current Outpatient Medications   Medication Sig Dispense Refill    SUMAtriptan (IMITREX) 25 MG tablet Take 1 tablet by mouth once as needed for Migraine 27 tablet 1    sertraline (ZOLOFT) 100 MG tablet Take 1 tablet by mouth daily      traZODone (DESYREL) 100 MG tablet Take 1 tablet by mouth nightly      Omega 3 1000 MG CAPS Take 3,000 mg by mouth daily      amitriptyline (ELAVIL) 10 MG tablet Take 10 mg by mouth nightly       No current facility-administered medications for this visit. No family history on file. Past Medical History:   Diagnosis Date    Anxiety     Depression     PTSD (post-traumatic stress disorder)     SVT (supraventricular tachycardia) ([facility])       Past Surgical History:   Procedure Laterality Date    APPENDECTOMY        No Known Allergies     Lab Results   Component Value Date     10/18/2022    K 4.3 10/18/2022     10/18/2022    CO2 26 10/18/2022    BUN 15 10/18/2022    CREATININE 0.8 10/18/2022    GLUCOSE 87 10/18/2022    CALCIUM 9.8 10/18/2022    PROT 7.7 10/18/2022    LABALBU 4.7 10/18/2022    BILITOT 0.5 10/18/2022    ALKPHOS 89 10/18/2022    AST 19 10/18/2022    ALT 22 10/18/2022    LABGLOM >60 10/18/2022        Lab Results   Component Value Date    WBC 7.2 10/18/2022    HGB 15.5 10/18/2022    HCT 47.1 10/18/2022    MCV 83.4 10/18/2022     10/18/2022                EMR Dragon/transcription disclaimer:  Much of this encounter note is electronic transcription/translation of spoken language toprinted texts. The electronic translation of spoken language may be erroneous, or at times, nonsensical words or phrases may be inadvertently transcribed. Although I have reviewed the note for such errors, some may stillexist.      An electronic signature was used to authenticate this note.     --Jeannie To MD

## 2022-10-19 ENCOUNTER — TELEPHONE (OUTPATIENT)
Dept: PRIMARY CARE CLINIC | Age: 22
End: 2022-10-19

## 2022-10-19 NOTE — TELEPHONE ENCOUNTER
----- Message from Debra Plummer sent at 10/19/2022 10:21 AM CDT -----  I got this pt scheduled but can you have provider put a different diagnosis on the order? I used the one he has now but not sure pre-cert will take it. He can use G47.10, G47.30 or G47.33. Thank you,  [facility]       ----- Message -----  From: Jerman Hemphill  Sent: 10/18/2022   2:28 PM CDT  To: Isma Crenshaw, Dr Fanny Briggs put a sleep study in for this patient, his  note is complete.  Thank you

## 2022-11-04 ENCOUNTER — HOSPITAL ENCOUNTER (OUTPATIENT)
Dept: SLEEP CENTER | Age: 22
Discharge: HOME | End: 2022-11-06
Payer: COMMERCIAL

## 2022-11-04 DIAGNOSIS — T73.3XXA FATIGUE DUE TO EXCESSIVE EXERTION, INITIAL ENCOUNTER: ICD-10-CM

## 2022-11-04 PROCEDURE — G0399 HOME SLEEP TEST/TYPE 3 PORTA: HCPCS

## 2022-11-25 DIAGNOSIS — G47.33 SLEEP APNEA, OBSTRUCTIVE: Primary | ICD-10-CM

## 2022-11-26 NOTE — PROGRESS NOTES
[address] [place]  Phone [phone] Fax [phone]     Patient Name: Fredi Vargas 2022  : 2000  Age: 23 y.o.   Patient Address: [address] [place] [address]       Patient Phone: [phone] (home)     REFERRAL  Referred to: DME provider of patient's choice  Fredi Vargas is referred for the following:    DME Equipment HPCPS Code Setting   Auto Adjusting CPAP device with flex or comparable pressure relief per comfort  10cm to 20cm   Heated Humidifier  Patient Choice       Replinishible PAP Supplies, 1 year supply  Item HPCPS Code Frequency   Mask of choice  or  1 per 3 months   Nasal Mask cushion/pillows  or  2 per 30 days   Full Face Mask Interface  1 per 30 days   Headgear  1 per 6 months   Tubing, length of choice  or  1 per 3 months   Water Chamber  1 per 6 months   Chinstrap  1 per 6 months   Disposable Filters  2 per 30 days   Reusable Filters  1 per 6 months     Diagnoses:  Obstructive sleep apnea (G47.33)  Length of Need: Lifetime, 99    Ordering Provider: NEAL Richardson Dys: [id number]         Signature:       Date: 2022      Electronically Signed by Melvin Cooper M.D.

## 2022-11-26 NOTE — PROCEDURES
[address], [address]  Phone [phone] Fax [phone]     Home Sleep Test Report    Patient Name Meliton Pratt Account Number [de-identified]    2000 Referring Provider Amy eHrbert M.D.   Age/ Gender 22 years/M Interpreting physician Ena Seth M.D., DABROMANA, ABMS   Neck circumference Unavailable Device Stardust II   Mallampati classification Unavailable Scoring Technician Mary Feldman, CRT, RPSGT   Dexter score 20/24 Indications for the test witnessed apneas   Height 71.0 in Test Home Sleep Apnea Test   Weight 326.0 lbs Date of test 2022   BMI 45.5 Time in Bed (TIB) 462.9 minutes         Diagnoses:    Obstructive Sleep Apnea (G47.33), AHI of 39.8     II. Morbid Obesity (E66.01)        Recommendations:    Mr. Alex Acuna will be set up on an auto adjusting CPAP with a range of 10cm to 20cm by the DME provider of his choice. A sleep clinic follow up appointment has been arranged. II. Mr. Alex Acuna may benefit from weight reduction. III. He should avoid driving or operating heavy equipment when drowsy and the use of respiratory suppressants. Ena Seth M.D., KAMLA         Board certified sleep physician      Home Sleep Test Report        History:    Meliton Pratt is a 23year old, 71.0 inch tall, 326.0 pound (BMI 45.5) man with snoring, witnessed apneas, and excessive daytime somnolence who was referred for a home sleep test.  He has difficulty initiating and maintaining sleep and gets 6 hours of sleep a night. He snores, has been witnessed to have apneas during sleep, and has morning headaches. He is drowsy during the day and falls asleep when sedentary and also takes naps. His Dexter Sleepiness Score is 20. His medical history is significant for depression, anxiety, PTSD, and SVT.         Summary of the Home Sleep Test:    The Home Sleep Test performed on 2022 showed obstructive sleep apnea with an apnea and hypopnea index of 39.8 events per hour. He had oxygen desaturations as low as 57% and spent 62.7 minutes with an oxygen saturation less than 90%. His pulse varied from 46 to 98 and averaged 68.1 beats per minute. Please see the data sheets for details.                                    Martin Aguilera M.D., [facility]         Board certified sleep physician

## 2022-12-28 ENCOUNTER — TELEPHONE (OUTPATIENT)
Dept: NEUROLOGY | Age: 22
End: 2022-12-28

## 2022-12-28 NOTE — TELEPHONE ENCOUNTER
Called and left patient a VM to let him know that I have him scheduled an appointment with Dr. Kinza Ross after being setup on his DME sleep machine. Left on VM of the appointment time/date/location/phone #.

## 2023-01-17 NOTE — PROGRESS NOTES
Subjective    Mr. Hernandez is 22 y.o. male    Chief Complaint: Encounter for Vasectomy Counciling    History of Present Illness  The patient has been pondering the option of a vasectomy for2 months. Anatomically this is a lower genital tract issue/procedure. With regard to context of the decision, he presently has 3 children. He is . Associated/Relevant symptoms/signs include left testicular pain. He voices no additional questions about birth control options.     The following portions of the patient's history were reviewed and updated as appropriate: allergies, current medications, past family history, past medical history, past social history, past surgical history and problem list.    Review of Systems      Current Outpatient Medications:   •  amitriptyline (ELAVIL) 10 MG tablet, Take 10 mg by mouth., Disp: , Rfl:   •  Omega-3 Fatty Acids (fish oil) 1000 MG capsule capsule, Take 3,000 mg by mouth., Disp: , Rfl:   •  sertraline (ZOLOFT) 100 MG tablet, Take 1 tablet by mouth Daily., Disp: , Rfl:   •  SUMAtriptan (IMITREX) 25 MG tablet, Take 1 tablet by mouth 1 (One) Time As Needed., Disp: , Rfl:   •  TRAZODONE HCL PO, Take  by mouth Every Night., Disp: , Rfl:   •  ALPRAZolam (Xanax) 2 MG tablet, Take 1 tablet by mouth At Night As Needed for Anxiety. Take 30 minutes prior to procedure, Disp: 8 tablet, Rfl: 0  •  HYDROcodone-acetaminophen (Norco) 5-325 MG per tablet, Take 1 tablet by mouth Every 6 (Six) Hours As Needed for Severe Pain., Disp: 8 tablet, Rfl: 0    Past Medical History:   Diagnosis Date   • Anxiety    • PTSD (post-traumatic stress disorder)        Past Surgical History:   Procedure Laterality Date   • APPENDECTOMY         Social History     Socioeconomic History   • Marital status:    Tobacco Use   • Smoking status: Never   • Smokeless tobacco: Former   Vaping Use   • Vaping Use: Never used   Substance and Sexual Activity   • Alcohol use: Yes     Comment: social    • Drug use: Never   •  Sexual activity: Defer       Family History   Problem Relation Age of Onset   • Cancer Mother    • Clotting disorder Maternal Grandfather         prostate / lung       Objective    There were no vitals taken for this visit.    Physical Exam  Genitourinary:     Penis: Normal.       Testes: Normal.      Comments: Vas palpable bilaterally            Results for orders placed or performed in visit on 12/21/22   QuantiFERON-TB Gold Plus    Specimen: Blood   Result Value Ref Range    QuantiFERON Incubation Incubation performed.     QUANTIFERON-TB GOLD PLUS Negative Negative   QuantiFERON-TB Gold Plus    Specimen: Blood   Result Value Ref Range    QuantiFERON Criteria Comment     QUANTIFERON TB1 AG VALUE 0.01 IU/mL    QUANTIFERON TB2 AG VALUE 0.03 IU/mL    QuantiFERON Nil Value 0.03 IU/mL    QuantiFERON Mitogen Value >10.00 IU/mL     Assessment and Plan    Diagnoses and all orders for this visit:    1. Encounter for vasectomy counseling (Primary)  -     Vasectomy; Future  -     ALPRAZolam (Xanax) 2 MG tablet; Take 1 tablet by mouth At Night As Needed for Anxiety. Take 30 minutes prior to procedure  Dispense: 8 tablet; Refill: 0  -     HYDROcodone-acetaminophen (Norco) 5-325 MG per tablet; Take 1 tablet by mouth Every 6 (Six) Hours As Needed for Severe Pain.  Dispense: 8 tablet; Refill: 0            He was given the consent form, pre-vasectomy instruction sheet, and vasectomy booklet. I extensively reviewed with him the likely postoperative recuperative period as well as the need to continue to use contraception until he is notified by us of his sterility. He will have a semen analysis after 20-30 ejaculations. He understands the potential side effects of local anesthesia, bleeding, scrotal hematoma, wound infection, epididymal orchitis, epididymal congestion,  1% risk chronic testicular pain potentially requiring further surgery, sperm granuloma, antisperm antibodies, early recanalization, spontaneous recanalization with  pregnancy after demonstration of azoospermia risk of 1 in 2000 and the possible association with prostate cancer. He is aware of alternatives to vasectomy. He has given this careful consideration and wishes to proceed with a vasectomy.

## 2023-01-23 ENCOUNTER — OFFICE VISIT (OUTPATIENT)
Dept: UROLOGY | Facility: CLINIC | Age: 23
End: 2023-01-23
Payer: COMMERCIAL

## 2023-01-23 DIAGNOSIS — Z30.09 ENCOUNTER FOR VASECTOMY COUNSELING: Primary | ICD-10-CM

## 2023-01-23 PROCEDURE — 99213 OFFICE O/P EST LOW 20 MIN: CPT | Performed by: UROLOGY

## 2023-01-23 RX ORDER — CHLORAL HYDRATE 500 MG
3000 CAPSULE ORAL
COMMUNITY

## 2023-01-23 RX ORDER — ALPRAZOLAM 2 MG/1
2 TABLET ORAL NIGHTLY PRN
Qty: 8 TABLET | Refills: 0 | Status: SHIPPED | OUTPATIENT
Start: 2023-01-23

## 2023-01-23 RX ORDER — SERTRALINE HYDROCHLORIDE 100 MG/1
1 TABLET, FILM COATED ORAL DAILY
COMMUNITY

## 2023-01-23 RX ORDER — HYDROCODONE BITARTRATE AND ACETAMINOPHEN 5; 325 MG/1; MG/1
1 TABLET ORAL EVERY 6 HOURS PRN
Qty: 8 TABLET | Refills: 0 | Status: SHIPPED | OUTPATIENT
Start: 2023-01-23 | End: 2023-02-23

## 2023-01-23 RX ORDER — SUMATRIPTAN 25 MG/1
1 TABLET, FILM COATED ORAL ONCE AS NEEDED
COMMUNITY
Start: 2022-10-18

## 2023-01-23 RX ORDER — AMITRIPTYLINE HYDROCHLORIDE 10 MG/1
10 TABLET, FILM COATED ORAL NIGHTLY PRN
COMMUNITY

## 2023-03-22 ENCOUNTER — TELEPHONE (OUTPATIENT)
Dept: BARIATRICS/WEIGHT MGMT | Facility: CLINIC | Age: 23
End: 2023-03-22
Payer: OTHER GOVERNMENT

## 2023-03-31 ENCOUNTER — TELEPHONE (OUTPATIENT)
Dept: UROLOGY | Facility: CLINIC | Age: 23
End: 2023-03-31
Payer: OTHER GOVERNMENT

## 2023-04-03 ENCOUNTER — PROCEDURE VISIT (OUTPATIENT)
Dept: UROLOGY | Facility: CLINIC | Age: 23
End: 2023-04-03
Payer: COMMERCIAL

## 2023-04-03 DIAGNOSIS — Z30.2 ENCOUNTER FOR VASECTOMY: ICD-10-CM

## 2023-04-03 PROCEDURE — 55250 REMOVAL OF SPERM DUCT(S): CPT | Performed by: UROLOGY

## 2023-04-03 NOTE — PROGRESS NOTES
No Scalpel Vasectomy Procedure Note    Indications: 22 y.o. male desiring permanent sterilization    Pre-operative Diagnosis: Undesired fertility    Post-operative Diagnosis: Undesired fertility    Anesthesia: Lidocaine 1% without epinephrine     Procedure Details     The risks and benefits of the procedure were discussed at the pre-procedure consultation, and written, informed consent obtained.    Premedicated with Norco 5 and Xanax 2 mg 30 minutes prior to procedure.    The scrotum was palpated with both testes normal in size and position, no masses palpated. The scrotum was cleansed with warm Betadine and draped in the usual sterile manner.     A vasal sheath block was performed on both the left and right vas.  After adequate anesthesia was established, a small perforation was made in the skin and the right vas was isolated with the ring forceps, dissected free and delivered through the skin perforation.  The right vas was divided, approximately 3 cm portion removed, and each end of the vas was cauterized.  The ends of the vas were replaced in the scrotum through the puncture site.  The left vas was then isolated, divided, cauterized in a similar fashion.  Midportions removed not sent to pathology to confirm because they were grossly normal.     Any bleeding was controlled with electrocautery.  3.0 Chormic interrupted suture was used to close both sites. The puncture site was dry when the procedure was completed. Dressing was applied to both incisions and jock strap placed for scrotal support.    Specimen: None    Condition: Stable    Complications: None    Plan:  1. Continue contraception until negative sperm analysis. Bring 2 semen samples after 20-30 ejaculates  2. Warning signs of infection were reviewed.   3. Patient is taken home by significant other with written home care instructions.  • Bedrest X 48 hrs, Ice pack every 3 hours for 24 hrs.    • Call the clinic if excessive pain, bleeding or swelling.

## 2023-04-04 ENCOUNTER — OFFICE VISIT (OUTPATIENT)
Dept: PRIMARY CARE CLINIC | Age: 23
End: 2023-04-04
Payer: MEDICAID

## 2023-04-04 VITALS
HEIGHT: 71 IN | HEART RATE: 98 BPM | BODY MASS INDEX: 44.1 KG/M2 | TEMPERATURE: 97.2 F | DIASTOLIC BLOOD PRESSURE: 84 MMHG | SYSTOLIC BLOOD PRESSURE: 142 MMHG | WEIGHT: 315 LBS | OXYGEN SATURATION: 98 %

## 2023-04-04 DIAGNOSIS — Z76.0 MEDICATION REFILL: Primary | ICD-10-CM

## 2023-04-04 DIAGNOSIS — F33.0 MAJOR DEPRESSIVE DISORDER, RECURRENT, MILD (HCC): ICD-10-CM

## 2023-04-04 DIAGNOSIS — R03.0 ELEVATED BP WITHOUT DIAGNOSIS OF HYPERTENSION: ICD-10-CM

## 2023-04-04 PROCEDURE — 1036F TOBACCO NON-USER: CPT | Performed by: FAMILY MEDICINE

## 2023-04-04 PROCEDURE — 99214 OFFICE O/P EST MOD 30 MIN: CPT | Performed by: FAMILY MEDICINE

## 2023-04-04 PROCEDURE — G8427 DOCREV CUR MEDS BY ELIG CLIN: HCPCS | Performed by: FAMILY MEDICINE

## 2023-04-04 PROCEDURE — G8417 CALC BMI ABV UP PARAM F/U: HCPCS | Performed by: FAMILY MEDICINE

## 2023-04-04 RX ORDER — SERTRALINE HYDROCHLORIDE 100 MG/1
100 TABLET, FILM COATED ORAL DAILY
Qty: 90 TABLET | Refills: 2 | Status: SHIPPED | OUTPATIENT
Start: 2023-04-04

## 2023-04-04 RX ORDER — OMEGA-3 FATTY ACIDS/FISH OIL 300-1000MG
3000 CAPSULE ORAL DAILY
Qty: 60 CAPSULE | Refills: 2 | Status: SHIPPED | OUTPATIENT
Start: 2023-04-04

## 2023-04-04 RX ORDER — TRAZODONE HYDROCHLORIDE 100 MG/1
100 TABLET ORAL NIGHTLY
Qty: 30 TABLET | Refills: 3 | Status: SHIPPED | OUTPATIENT
Start: 2023-04-04

## 2023-04-04 SDOH — ECONOMIC STABILITY: FOOD INSECURITY: WITHIN THE PAST 12 MONTHS, THE FOOD YOU BOUGHT JUST DIDN'T LAST AND YOU DIDN'T HAVE MONEY TO GET MORE.: NEVER TRUE

## 2023-04-04 SDOH — ECONOMIC STABILITY: HOUSING INSECURITY
IN THE LAST 12 MONTHS, WAS THERE A TIME WHEN YOU DID NOT HAVE A STEADY PLACE TO SLEEP OR SLEPT IN A SHELTER (INCLUDING NOW)?: NO

## 2023-04-04 SDOH — ECONOMIC STABILITY: FOOD INSECURITY: WITHIN THE PAST 12 MONTHS, YOU WORRIED THAT YOUR FOOD WOULD RUN OUT BEFORE YOU GOT MONEY TO BUY MORE.: NEVER TRUE

## 2023-04-04 SDOH — ECONOMIC STABILITY: INCOME INSECURITY

## 2023-04-04 ASSESSMENT — ENCOUNTER SYMPTOMS
ABDOMINAL PAIN: 0
BLOOD IN STOOL: 0
CHEST TIGHTNESS: 0
SHORTNESS OF BREATH: 0
WHEEZING: 0

## 2023-04-04 ASSESSMENT — PATIENT HEALTH QUESTIONNAIRE - PHQ9
SUM OF ALL RESPONSES TO PHQ QUESTIONS 1-9: 1
SUM OF ALL RESPONSES TO PHQ QUESTIONS 1-9: 1
1. LITTLE INTEREST OR PLEASURE IN DOING THINGS: 0
SUM OF ALL RESPONSES TO PHQ9 QUESTIONS 1 & 2: 1
SUM OF ALL RESPONSES TO PHQ QUESTIONS 1-9: 1
2. FEELING DOWN, DEPRESSED OR HOPELESS: 1
SUM OF ALL RESPONSES TO PHQ QUESTIONS 1-9: 1

## 2023-04-04 NOTE — PROGRESS NOTES
checked in the past and tends to be okay though is occasionally elevated. Patient denies any headache or related symptoms      Review of Systems   Constitutional:  Negative for activity change and fever. HENT:  Negative for congestion. Eyes:  Negative for visual disturbance. Respiratory:  Negative for chest tightness, shortness of breath and wheezing. Cardiovascular:  Negative for chest pain. Gastrointestinal:  Negative for abdominal pain and blood in stool. Genitourinary:  Negative for difficulty urinating and urgency. Neurological:  Negative for weakness and headaches. Psychiatric/Behavioral:  Negative for confusion. Objective   Physical Exam  Vitals reviewed. Constitutional:       General: He is not in acute distress. Appearance: Normal appearance. He is not ill-appearing. HENT:      Head: Normocephalic and atraumatic. Cardiovascular:      Rate and Rhythm: Normal rate and regular rhythm. Pulses: Normal pulses. Heart sounds: Normal heart sounds. No murmur heard. Pulmonary:      Effort: Pulmonary effort is normal. No respiratory distress. Breath sounds: Normal breath sounds. No wheezing or rhonchi. Chest:      Chest wall: No tenderness. Abdominal:      General: There is no distension. Tenderness: There is no abdominal tenderness. Musculoskeletal:         General: No swelling. Skin:     General: Skin is warm and dry. Neurological:      General: No focal deficit present. Mental Status: He is alert.           Vitals:    04/04/23 1503   BP: (!) 142/84   Pulse: 98   Temp: 97.2 °F (36.2 °C)   SpO2: 98%        Current Outpatient Medications   Medication Sig Dispense Refill    traZODone (DESYREL) 100 MG tablet Take 1 tablet by mouth nightly 30 tablet 3    sertraline (ZOLOFT) 100 MG tablet Take 1 tablet by mouth daily 90 tablet 2    Omega 3 1000 MG CAPS Take 3,000 mg by mouth daily 60 capsule 2    HYDROcodone-acetaminophen (NORCO) 5-325 MG per tablet

## 2023-09-29 ENCOUNTER — OFFICE VISIT (OUTPATIENT)
Dept: PRIMARY CARE CLINIC | Age: 23
End: 2023-09-29
Payer: MEDICAID

## 2023-09-29 VITALS
RESPIRATION RATE: 97 BRPM | HEIGHT: 71 IN | WEIGHT: 307 LBS | HEART RATE: 105 BPM | DIASTOLIC BLOOD PRESSURE: 82 MMHG | TEMPERATURE: 97.5 F | SYSTOLIC BLOOD PRESSURE: 122 MMHG | BODY MASS INDEX: 42.98 KG/M2

## 2023-09-29 DIAGNOSIS — F51.5 NIGHTMARE: ICD-10-CM

## 2023-09-29 DIAGNOSIS — L02.91 ABSCESS: Primary | ICD-10-CM

## 2023-09-29 PROCEDURE — 1036F TOBACCO NON-USER: CPT | Performed by: FAMILY MEDICINE

## 2023-09-29 PROCEDURE — G8427 DOCREV CUR MEDS BY ELIG CLIN: HCPCS | Performed by: FAMILY MEDICINE

## 2023-09-29 PROCEDURE — 99214 OFFICE O/P EST MOD 30 MIN: CPT | Performed by: FAMILY MEDICINE

## 2023-09-29 PROCEDURE — G8417 CALC BMI ABV UP PARAM F/U: HCPCS | Performed by: FAMILY MEDICINE

## 2023-09-29 RX ORDER — SULFAMETHOXAZOLE AND TRIMETHOPRIM 800; 160 MG/1; MG/1
1 TABLET ORAL 2 TIMES DAILY
Qty: 20 TABLET | Refills: 0 | Status: SHIPPED | OUTPATIENT
Start: 2023-09-29 | End: 2023-10-09

## 2023-09-29 RX ORDER — PRAZOSIN HYDROCHLORIDE 5 MG/1
5 CAPSULE ORAL NIGHTLY
Qty: 30 CAPSULE | Refills: 0 | Status: SHIPPED | OUTPATIENT
Start: 2023-09-29

## 2023-09-29 ASSESSMENT — ENCOUNTER SYMPTOMS
SHORTNESS OF BREATH: 0
BLOOD IN STOOL: 0
WHEEZING: 0
CHEST TIGHTNESS: 0
ABDOMINAL PAIN: 0

## 2023-09-29 NOTE — PROGRESS NOTES
Robert Mcdaniels (:  2000) is a 20 y.o. male,Established patient, here for evaluation of the following chief complaint(s): Mass (Mass on back x 2 days. Started as the size of an M&M and has grown since then. Was seen at urgent care and they suggested being seen by PCP.) and Discuss Medications (Has been having nightmares at night and thinks he needs to increase his sertraline or change medications.)         ASSESSMENT/PLAN:  1. Abscess  -     sulfamethoxazole-trimethoprim (BACTRIM DS;SEPTRA DS) 800-160 MG per tablet; Take 1 tablet by mouth 2 times daily for 10 days, Disp-20 tablet, R-0Normal  2. Nightmare  -     prazosin (MINIPRESS) 5 MG capsule; Take 1 capsule by mouth nightly, Disp-30 capsule, R-0Normal      This appearance is consistent with a likely deep-seated abscess. Counseled patient that given there is no fluctuance and no clear area to attempt to lyse I am not comfortable attempting to do an I&D on this. We will try to treat conservatively with a 10-day course of Bactrim at this time. Patient advised to closely monitor this area and if it fails to resolve with antibiotics to return for further evaluation. If antibiotics do fail to resolve this and it continues to enlarge we may need to seek surgical care given how deep that it appears to be. We will do trial with 5 mg prazosin nightly for nightmares. Patient was agreeable to trying this      Return if symptoms worsen or fail to improve. Subjective   SUBJECTIVE/OBJECTIVE:  Robert Mcdaniels is a 20 y.o. male who presents due to pain and swelling on his left posterior back. Patient says that this popped up around 2 days ago and he saw urgent care around the same time. Patient says it was originally the size of an M&M however has progressively gotten larger and is now spreading outward. They did prescribe for him an antibiotic however they sent it to the wrong pharmacy and patient never picked it up and has not been taking it.   Patient says

## 2023-10-02 RX ORDER — PRAZOSIN HYDROCHLORIDE 5 MG/1
5 CAPSULE ORAL
Qty: 90 CAPSULE | Refills: 1 | Status: SHIPPED | OUTPATIENT
Start: 2023-10-02

## 2023-10-04 ENCOUNTER — OFFICE VISIT (OUTPATIENT)
Dept: PRIMARY CARE CLINIC | Age: 23
End: 2023-10-04
Payer: MEDICAID

## 2023-10-04 VITALS
RESPIRATION RATE: 16 BRPM | HEART RATE: 117 BPM | TEMPERATURE: 97.8 F | OXYGEN SATURATION: 98 % | DIASTOLIC BLOOD PRESSURE: 82 MMHG | SYSTOLIC BLOOD PRESSURE: 122 MMHG

## 2023-10-04 DIAGNOSIS — H93.13 TINNITUS OF BOTH EARS: ICD-10-CM

## 2023-10-04 DIAGNOSIS — L02.91 ABSCESS: ICD-10-CM

## 2023-10-04 DIAGNOSIS — R00.2 PALPITATIONS: ICD-10-CM

## 2023-10-04 DIAGNOSIS — R03.0 ELEVATED BP WITHOUT DIAGNOSIS OF HYPERTENSION: Primary | ICD-10-CM

## 2023-10-04 PROCEDURE — 1036F TOBACCO NON-USER: CPT | Performed by: FAMILY MEDICINE

## 2023-10-04 PROCEDURE — 93000 ELECTROCARDIOGRAM COMPLETE: CPT | Performed by: FAMILY MEDICINE

## 2023-10-04 PROCEDURE — 99214 OFFICE O/P EST MOD 30 MIN: CPT | Performed by: FAMILY MEDICINE

## 2023-10-04 PROCEDURE — G8484 FLU IMMUNIZE NO ADMIN: HCPCS | Performed by: FAMILY MEDICINE

## 2023-10-04 PROCEDURE — G8427 DOCREV CUR MEDS BY ELIG CLIN: HCPCS | Performed by: FAMILY MEDICINE

## 2023-10-04 PROCEDURE — G8417 CALC BMI ABV UP PARAM F/U: HCPCS | Performed by: FAMILY MEDICINE

## 2023-10-04 RX ORDER — ONDANSETRON 8 MG/1
8 TABLET, ORALLY DISINTEGRATING ORAL 3 TIMES DAILY PRN
Qty: 20 TABLET | Refills: 0 | Status: SHIPPED | OUTPATIENT
Start: 2023-10-04

## 2023-10-04 ASSESSMENT — PATIENT HEALTH QUESTIONNAIRE - PHQ9
8. MOVING OR SPEAKING SO SLOWLY THAT OTHER PEOPLE COULD HAVE NOTICED. OR THE OPPOSITE, BEING SO FIGETY OR RESTLESS THAT YOU HAVE BEEN MOVING AROUND A LOT MORE THAN USUAL: 0
7. TROUBLE CONCENTRATING ON THINGS, SUCH AS READING THE NEWSPAPER OR WATCHING TELEVISION: 0
SUM OF ALL RESPONSES TO PHQ QUESTIONS 1-9: 5
4. FEELING TIRED OR HAVING LITTLE ENERGY: 0
5. POOR APPETITE OR OVEREATING: 0
2. FEELING DOWN, DEPRESSED OR HOPELESS: 1
SUM OF ALL RESPONSES TO PHQ QUESTIONS 1-9: 5
SUM OF ALL RESPONSES TO PHQ9 QUESTIONS 1 & 2: 2
10. IF YOU CHECKED OFF ANY PROBLEMS, HOW DIFFICULT HAVE THESE PROBLEMS MADE IT FOR YOU TO DO YOUR WORK, TAKE CARE OF THINGS AT HOME, OR GET ALONG WITH OTHER PEOPLE: 1
3. TROUBLE FALLING OR STAYING ASLEEP: 2
SUM OF ALL RESPONSES TO PHQ QUESTIONS 1-9: 5
6. FEELING BAD ABOUT YOURSELF - OR THAT YOU ARE A FAILURE OR HAVE LET YOURSELF OR YOUR FAMILY DOWN: 1
SUM OF ALL RESPONSES TO PHQ QUESTIONS 1-9: 5
9. THOUGHTS THAT YOU WOULD BE BETTER OFF DEAD, OR OF HURTING YOURSELF: 0
1. LITTLE INTEREST OR PLEASURE IN DOING THINGS: 1

## 2023-10-06 ASSESSMENT — ENCOUNTER SYMPTOMS
SHORTNESS OF BREATH: 0
BLOOD IN STOOL: 0
WHEEZING: 0
ABDOMINAL PAIN: 0
CHEST TIGHTNESS: 0

## 2023-10-16 ENCOUNTER — HOSPITAL ENCOUNTER (OUTPATIENT)
Dept: NON INVASIVE DIAGNOSTICS | Age: 23
Discharge: HOME | End: 2023-10-16
Payer: MEDICAID

## 2023-10-16 DIAGNOSIS — R00.2 PALPITATIONS: ICD-10-CM

## 2023-10-16 PROCEDURE — 6360000004 HC RX CONTRAST MEDICATION: Performed by: FAMILY MEDICINE

## 2023-10-16 PROCEDURE — C8929 TTE W OR WO FOL WCON,DOPPLER: HCPCS

## 2023-10-16 RX ADMIN — PERFLUTREN 1.5 ML: 6.52 INJECTION, SUSPENSION INTRAVENOUS at 15:32

## 2023-11-16 ENCOUNTER — OFFICE VISIT (OUTPATIENT)
Dept: ENT CLINIC | Age: 23
End: 2023-11-16
Payer: MEDICAID

## 2023-11-16 ENCOUNTER — PROCEDURE VISIT (OUTPATIENT)
Dept: ENT CLINIC | Age: 23
End: 2023-11-16
Payer: MEDICAID

## 2023-11-16 VITALS
WEIGHT: 300 LBS | DIASTOLIC BLOOD PRESSURE: 80 MMHG | BODY MASS INDEX: 42 KG/M2 | SYSTOLIC BLOOD PRESSURE: 126 MMHG | HEIGHT: 71 IN

## 2023-11-16 DIAGNOSIS — H90.3 SENSORINEURAL HEARING LOSS (SNHL) OF BOTH EARS: ICD-10-CM

## 2023-11-16 DIAGNOSIS — H90.3 SENSORINEURAL HEARING LOSS (SNHL) OF BOTH EARS: Primary | ICD-10-CM

## 2023-11-16 DIAGNOSIS — H93.13 TINNITUS OF BOTH EARS: Primary | ICD-10-CM

## 2023-11-16 LAB — RHEUMATOID FACT SER NEPH-ACNC: <10 IU/ML

## 2023-11-16 PROCEDURE — 99204 OFFICE O/P NEW MOD 45 MIN: CPT | Performed by: OTOLARYNGOLOGY

## 2023-11-16 PROCEDURE — 92557 COMPREHENSIVE HEARING TEST: CPT | Performed by: AUDIOLOGIST

## 2023-11-16 PROCEDURE — 1036F TOBACCO NON-USER: CPT | Performed by: OTOLARYNGOLOGY

## 2023-11-16 PROCEDURE — G8417 CALC BMI ABV UP PARAM F/U: HCPCS | Performed by: OTOLARYNGOLOGY

## 2023-11-16 PROCEDURE — G8484 FLU IMMUNIZE NO ADMIN: HCPCS | Performed by: OTOLARYNGOLOGY

## 2023-11-16 PROCEDURE — 92567 TYMPANOMETRY: CPT | Performed by: AUDIOLOGIST

## 2023-11-16 PROCEDURE — G8427 DOCREV CUR MEDS BY ELIG CLIN: HCPCS | Performed by: OTOLARYNGOLOGY

## 2023-11-16 RX ORDER — PREDNISONE 20 MG/1
TABLET ORAL
Qty: 21 TABLET | Refills: 0 | Status: SHIPPED | OUTPATIENT
Start: 2023-11-16

## 2023-11-16 ASSESSMENT — ENCOUNTER SYMPTOMS
RESPIRATORY NEGATIVE: 1
GASTROINTESTINAL NEGATIVE: 1
ALLERGIC/IMMUNOLOGIC NEGATIVE: 1
EYES NEGATIVE: 1

## 2023-11-16 NOTE — PROGRESS NOTES
History   Sadia Caputo is a 20 y.o. male who presented to the clinic this date with complaints of bilateral tinnitus. He reported symptoms have been ongoing since [facility] and has gradually gotten worse. He denied significant changes in hearing. He reported occasional left ear pain. Summary   Tympanometry consistent with normal TM mobility bilaterally. Pure tone testing indicates moderate SNHL bilaterally. Word recognition scores were good bilaterally. Based on degree of hearing loss and tinnitus, binaural hearing aids are recommended. Results   Otoscopy:   Right: Clear EAC/Normal TM  Left: Clear EAC/Normal TM    Audiometry:   Right: Moderate flat SNHL  Left: Moderate flat SNHL         Tympanometry:    Right: Type A  Left: Type A    Plan   Results of today's testing were discussed with Mr. Yorknikole Ramosman and the following recommendations were made: Follow up with ENT as scheduled. Hearing aid evaluation as desired. Monitor hearing yearly, sooner with changes. Hearing protection as warranted.         Audiogram and Acoustic Immittance

## 2023-11-16 NOTE — PROGRESS NOTES
2023    Jose Luis Iqbal (:  2000) is a 20 y.o. male, Established patient, here for evaluation of the following chief complaint(s):  New Patient (ears)      Vitals:    23 1543   BP: 126/80   Weight: 136.1 kg (300 lb)   Height: 1.803 m (5' 11\")       Wt Readings from Last 3 Encounters:   23 136.1 kg (300 lb)   23 (!) 139.3 kg (307 lb)   23 (!) 152 kg (335 lb)       BP Readings from Last 3 Encounters:   23 126/80   10/04/23 122/82   23 122/82         SUBJECTIVE/OBJECTIVE:    Seen today for his ears. 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 He still suffer from this. Hearing test today shows a flat moderate sensorineural hearing loss. He has not been treated for this. He does not wear hearing aids. Review of Systems   Constitutional: Negative. HENT:  Positive for hearing loss and tinnitus. Eyes: Negative. Respiratory: Negative. Cardiovascular: Negative. Gastrointestinal: Negative. Endocrine: Negative. Musculoskeletal: Negative. Skin: Negative. Allergic/Immunologic: Negative. Neurological: Negative. Hematological: Negative. Psychiatric/Behavioral: Negative. Physical Exam  Vitals reviewed. Constitutional:       Appearance: Normal appearance. He is normal weight. HENT:      Head: Normocephalic and atraumatic. Right Ear: Tympanic membrane, ear canal and external ear normal.      Left Ear: Tympanic membrane, ear canal and external ear normal.      Nose: Nose normal.      Mouth/Throat:      Mouth: Mucous membranes are moist.      Pharynx: Oropharynx is clear. Eyes:      Extraocular Movements: Extraocular movements intact. Pupils: Pupils are equal, round, and reactive to light. Cardiovascular:      Rate and Rhythm: Normal rate and regular rhythm.    Pulmonary:      Effort: Pulmonary effort is normal.

## 2023-11-17 ENCOUNTER — OFFICE VISIT (OUTPATIENT)
Dept: PRIMARY CARE CLINIC | Age: 23
End: 2023-11-17

## 2023-11-17 VITALS
RESPIRATION RATE: 16 BRPM | SYSTOLIC BLOOD PRESSURE: 114 MMHG | OXYGEN SATURATION: 97 % | DIASTOLIC BLOOD PRESSURE: 80 MMHG | TEMPERATURE: 97.9 F | HEART RATE: 66 BPM | HEIGHT: 71 IN | WEIGHT: 296 LBS | BODY MASS INDEX: 41.44 KG/M2

## 2023-11-17 DIAGNOSIS — H93.13 TINNITUS OF BOTH EARS: ICD-10-CM

## 2023-11-17 DIAGNOSIS — Z23 NEED FOR INFLUENZA VACCINATION: ICD-10-CM

## 2023-11-17 DIAGNOSIS — R00.2 PALPITATIONS: Primary | ICD-10-CM

## 2023-11-17 DIAGNOSIS — R05.1 ACUTE COUGH: ICD-10-CM

## 2023-11-17 RX ORDER — METOPROLOL SUCCINATE 25 MG/1
12.5 TABLET, EXTENDED RELEASE ORAL DAILY
Qty: 15 TABLET | Refills: 5 | Status: SHIPPED | OUTPATIENT
Start: 2023-11-17 | End: 2023-11-17 | Stop reason: CLARIF

## 2023-11-17 RX ORDER — TRAZODONE HYDROCHLORIDE 100 MG/1
100 TABLET ORAL NIGHTLY
Qty: 90 TABLET | Refills: 1 | Status: SHIPPED | OUTPATIENT
Start: 2023-11-17

## 2023-11-17 RX ORDER — BENZONATATE 200 MG/1
200 CAPSULE ORAL 3 TIMES DAILY PRN
Qty: 30 CAPSULE | Refills: 0 | Status: SHIPPED | OUTPATIENT
Start: 2023-11-17 | End: 2023-11-24

## 2023-11-17 ASSESSMENT — ENCOUNTER SYMPTOMS
ABDOMINAL PAIN: 0
WHEEZING: 0
CHEST TIGHTNESS: 0
SHORTNESS OF BREATH: 0
BLOOD IN STOOL: 0

## 2023-11-17 NOTE — PROGRESS NOTES
WBC 7.2 10/18/2022    HGB 15.5 10/18/2022    HCT 47.1 10/18/2022    MCV 83.4 10/18/2022     10/18/2022                EMR Dragon/transcription disclaimer:  Much of this encounter note is electronic transcription/translation of spoken language toprinted texts. The electronic translation of spoken language may be erroneous, or at times, nonsensical words or phrases may be inadvertently transcribed. Although I have reviewed the note for such errors, some may stillexist.      An electronic signature was used to authenticate this note.     --Lon Chadwick MD

## 2023-11-19 LAB — NUCLEAR IGG SER QL IA: NORMAL

## 2023-12-12 ENCOUNTER — HOSPITAL ENCOUNTER (OUTPATIENT)
Dept: NON INVASIVE DIAGNOSTICS | Age: 23
Discharge: HOME | End: 2023-12-12
Payer: MEDICAID

## 2023-12-12 ENCOUNTER — OFFICE VISIT (OUTPATIENT)
Dept: PRIMARY CARE CLINIC | Age: 23
End: 2023-12-12
Payer: MEDICAID

## 2023-12-12 VITALS
TEMPERATURE: 97.9 F | HEIGHT: 71 IN | OXYGEN SATURATION: 97 % | DIASTOLIC BLOOD PRESSURE: 76 MMHG | WEIGHT: 291 LBS | BODY MASS INDEX: 40.74 KG/M2 | RESPIRATION RATE: 16 BRPM | SYSTOLIC BLOOD PRESSURE: 124 MMHG | HEART RATE: 84 BPM

## 2023-12-12 DIAGNOSIS — R00.2 PALPITATIONS: ICD-10-CM

## 2023-12-12 DIAGNOSIS — H93.13 TINNITUS OF BOTH EARS: ICD-10-CM

## 2023-12-12 DIAGNOSIS — M54.30 SCIATIC LEG PAIN: Primary | ICD-10-CM

## 2023-12-12 PROCEDURE — 93225 XTRNL ECG REC<48 HRS REC: CPT

## 2023-12-12 PROCEDURE — G8417 CALC BMI ABV UP PARAM F/U: HCPCS | Performed by: FAMILY MEDICINE

## 2023-12-12 PROCEDURE — 1036F TOBACCO NON-USER: CPT | Performed by: FAMILY MEDICINE

## 2023-12-12 PROCEDURE — G8482 FLU IMMUNIZE ORDER/ADMIN: HCPCS | Performed by: FAMILY MEDICINE

## 2023-12-12 PROCEDURE — G8427 DOCREV CUR MEDS BY ELIG CLIN: HCPCS | Performed by: FAMILY MEDICINE

## 2023-12-12 PROCEDURE — 99214 OFFICE O/P EST MOD 30 MIN: CPT | Performed by: FAMILY MEDICINE

## 2023-12-12 RX ORDER — MELOXICAM 7.5 MG/1
7.5 TABLET ORAL DAILY PRN
Qty: 30 TABLET | Refills: 0 | Status: SHIPPED | OUTPATIENT
Start: 2023-12-12

## 2023-12-12 RX ORDER — BETAMETHASONE SODIUM PHOSPHATE AND BETAMETHASONE ACETATE 3; 3 MG/ML; MG/ML
6 INJECTION, SUSPENSION INTRA-ARTICULAR; INTRALESIONAL; INTRAMUSCULAR; SOFT TISSUE ONCE
Status: COMPLETED | OUTPATIENT
Start: 2023-12-12 | End: 2023-12-12

## 2023-12-12 RX ADMIN — BETAMETHASONE SODIUM PHOSPHATE AND BETAMETHASONE ACETATE 6 MG: 3; 3 INJECTION, SUSPENSION INTRA-ARTICULAR; INTRALESIONAL; INTRAMUSCULAR; SOFT TISSUE at 13:58

## 2023-12-12 ASSESSMENT — ENCOUNTER SYMPTOMS
BACK PAIN: 1
ABDOMINAL PAIN: 0
SHORTNESS OF BREATH: 0
CHEST TIGHTNESS: 0
BLOOD IN STOOL: 0
WHEEZING: 0

## 2023-12-12 NOTE — PROGRESS NOTES
Stephie Sawant (:  2000) is a 20 y.o. male,Established patient, here for evaluation of the following chief complaint(s):  Follow-up (Trying to get his claim through the [place] for the heart related issues and hearing loss. Needs it worded a certain way.) and Back Pain (Left sided back pain x 3 weeks. Having sciatic pain that radiates down his leg.)         ASSESSMENT/PLAN:  1. Sciatic leg pain  -     betamethasone acetate-betamethasone sodium phosphate (CELESTONE) injection 6 mg; 6 mg, IntraMUSCular, ONCE, 1 dose, On 23 at [address] [place] [address] Physical Therapy  2. Palpitations  3. Tinnitus of both ears      Acute leg pain consistent with sciatic leg pain. I am going to treat patient with a one-time Celestone injection and follow this up with daily meloxicam as needed. Did advise patient this may cause stomach upset if used over prolonged period and to notify us if this occurs. Additionally I am going to set patient up for PT which I discussed with him is about the only definitive way to get long-term relief from this. Patient is agreeable I have sent the referral to [facility]. Discussed with patient that the letter about his hearing would best come from audiology. Unfortunately we do not have the total picture with his heart arrhythmia yet and I do not feel like labeling it is appropriate at this point which patient is agreeable to. Did advise patient to  a Zio patch from the hospital which he can do on a walk-in basis    Return As scheduled. Subjective   SUBJECTIVE/OBJECTIVE:  Stephie Sawant is a 20 y.o. male who presents requesting documentation for VA benefits as well as wanting evaluation for new onset sciatic pain. Patient says he has been seeing 2 different chiropractors over the last 3 weeks without any improvement in his ongoing low back and leg pain. Patient says that the pain starts in the low back and radiates down the left leg.   Patient has had numbness and 19-May-2021

## 2023-12-21 ENCOUNTER — APPOINTMENT (OUTPATIENT)
Dept: PHYSICAL THERAPY | Age: 23
End: 2023-12-21
Payer: MEDICAID

## 2023-12-26 ENCOUNTER — HOSPITAL ENCOUNTER (OUTPATIENT)
Dept: PHYSICAL THERAPY | Age: 23
Setting detail: THERAPIES SERIES
Discharge: HOME | End: 2023-12-26
Payer: MEDICAID

## 2023-12-26 PROCEDURE — 97110 THERAPEUTIC EXERCISES: CPT

## 2023-12-26 PROCEDURE — 97012 MECHANICAL TRACTION THERAPY: CPT

## 2023-12-26 NOTE — PROGRESS NOTES
Physical Therapy  Daily Treatment Note  Date: 2023  Patient Name: Annabel Amaya  MRN: [id number]     :   2000      Subjective:   General  Additional Pertinent Hx: 19 y/o M presents with c/o low back and LLE pain. PMH includes PTSD, SVT  PT Visit Information  PT Insurance Information: [facility] Medicaid  Total # of Visits Approved: 10  Total # of Visits to Date: 2  Plan of Care/Certification Expiration Date: 24  Progress Note Due Date: 24  Referring Provider (secondary): Jose Enrique Rico MD  Subjective: i have been looking forward to the traction all week. Pain Screening  Patient Currently in Pain: Yes  Pain Assessment: 0-10  Pain Type: Acute pain  Pain Location: Hip;Leg  Pain Orientation: Left  Pain Descriptors: Numbness;Tightness; Aching         Treatment Activities:    Exercises  Exercise 1: Assess leg length     mostly even possibly left longer  Exercise 2: Isometric hip extension from [facility]   L x 5  Exercise 3: Isometric hip flexion from /  R  x 5  Exercise 4: TA contraction (alone x 5 with 5 second, UE, LE, UE/LE  x 5 each)  Exercise 5: Pelvic tilt on towel  x 10  Exercise 6: Hooklying lumbar rotation  x 10  Exercise 7: Supine quadratus stretch  x 4  with 10 second hold  Exercise 8: Supine piriformis stretch (fig 4, knee to opp chest)   x 4 with 10 second hold  Exercise 9: SKTC/DKTC  Exercise 10: Bridging/Single leg L  x 5 each  Exercise 11: Sitting fwd reach (to R, to L)  x 5  Exercise 12: Sitting L hip retraction  Exercise 13: Repeated sit to stand with TA contraction  Exercise 14: Multifidus row/Paloff press  Exercise 15: Lat pulldowns  Exercise 16: Standing march  Exercise 17: Standing hip abd/ext  Exercise 18:  Mini squats  Exercise 19: IPT  x 20 minutes   gradually increased to 90 pounds    Assessment:   Conditions Requiring Skilled Therapeutic Intervention  Body Structures, Functions, Activity Limitations Requiring Skilled Therapeutic Intervention: Decreased functional

## 2023-12-27 ENCOUNTER — APPOINTMENT (OUTPATIENT)
Dept: PHYSICAL THERAPY | Age: 23
End: 2023-12-27
Payer: MEDICAID

## 2024-01-03 ENCOUNTER — HOSPITAL ENCOUNTER (OUTPATIENT)
Dept: PHYSICAL THERAPY | Age: 24
Setting detail: THERAPIES SERIES
Discharge: HOME | End: 2024-01-03
Payer: MEDICAID

## 2024-01-03 PROCEDURE — 97012 MECHANICAL TRACTION THERAPY: CPT

## 2024-01-03 PROCEDURE — 97110 THERAPEUTIC EXERCISES: CPT

## 2024-01-03 ASSESSMENT — PAIN DESCRIPTION - DESCRIPTORS: DESCRIPTORS: NUMBNESS

## 2024-01-03 ASSESSMENT — PAIN DESCRIPTION - LOCATION: LOCATION: LEG

## 2024-01-03 ASSESSMENT — PAIN DESCRIPTION - ORIENTATION: ORIENTATION: LEFT

## 2024-01-03 NOTE — PROGRESS NOTES
Physical Therapy  Daily Treatment Note  Date: 1/3/2024  Patient Name: Geovany Armendariz  MRN: [id number]     :   2000      Subjective:   General  Additional Pertinent Hx: 24 y/o M presents with c/o low back and LLE pain.  PMH includes PTSD, SVT  PT Visit Information  PT Insurance Information: [employer]  Total # of Visits Approved: 10  Total # of Visits to Date: 3  Plan of Care/Certification Expiration Date: 24  Progress Note Due Date: 24  Referring Provider (secondary): Marcus Case MD  Subjective: The numbness is starting to creep back down the leg.  No pain.    Pain Screening  Patient Currently in Pain: Denies (numbness left leg)  Pain Location: Leg  Pain Orientation: Left  Pain Descriptors: Numbness         Treatment Activities:    Exercises  Exercise 1: Assess leg length     mostly even possibly left longer  Exercise 2: Isometric hip extension from SKTC  R x 5  Exercise 3: Isometric hip flexion from 90/90   L  x 5  Exercise 4: TA contraction (alone x 5 with 5 second, UE, LE, UE/LE  x 10 each)  Exercise 5: Pelvic tilt on towel  x 10  Exercise 6: Hooklying lumbar rotation  x 10  Exercise 7: Supine quadratus stretch  x 4  with 15 second hold  Exercise 8: Supine piriformis stretch (fig 4, knee to opp chest)   x 4 with 10 second hold  Exercise 9: SKTC/DKTC  Exercise 10: Bridging x 10/Single leg L  x 5  Exercise 11: Sitting fwd reach (to R, to L)  x 5 with 5 sec hold  Exercise 12: Sitting L hip retraction x 5 with 5 sec hold  Exercise 13: Repeated sit to stand with TA contraction  x 10  Exercise 14: Multifidus row/Paloff press  blue x 10  Exercise 15: Lat pulldowns  Exercise 16: Standing march  x 10  Exercise 17: Standing hip abd/ext  x 10  Exercise 18: Mini squats x 10  Exercise 19: IPT  x 20 minutes    90 pounds  Exercise 20:   HEP issued with blue t-band    Assessment:   Conditions Requiring Skilled Therapeutic Intervention  Body Structures, Functions, Activity Limitations Requiring Skilled

## 2024-01-04 ENCOUNTER — HOSPITAL ENCOUNTER (OUTPATIENT)
Dept: PHYSICAL THERAPY | Age: 24
Setting detail: THERAPIES SERIES
Discharge: HOME | End: 2024-01-04
Payer: MEDICAID

## 2024-01-04 PROCEDURE — 97110 THERAPEUTIC EXERCISES: CPT

## 2024-01-04 PROCEDURE — 97012 MECHANICAL TRACTION THERAPY: CPT

## 2024-01-04 NOTE — PROGRESS NOTES
Physical Therapy: Daily Note   Patient: Geovany Armendariz (23 y.o. male)   Examination Date: 2024  Plan of Care/Certification Expiration Date: 24    No data recorded   :  2000 # of Visits since SOC:   4   MRN: [id number]  CSN: [id number] Start of Care Date:   2023   Insurance: Payor: [employer] / Plan: [employer] [facility] / Product Type: *No Product type* /   Insurance ID: [id number] - (Medicaid Managed) Secondary Insurance (if applicable):    Referring Physician: Marcus Case MD Ethan Walker, MD   PCP: Marcus Case MD Visits to Date/Visits Approved: 4 / 10    No Show/Cancelled Appts:   /       Medical Diagnosis: Sciatica, unspecified side [M54.30] Back pain  Treatment Diagnosis: Back pain        SUBJECTIVE EXAMINATION   Pain Level: Pain Screening  Pain Assessment: 0-10    Patient Comments: Subjective: Patient states that he did fairly well after his last session, numbness in left leg has gone away since last treatment.    HEP Compliance:           OBJECTIVE EXAMINATION   Restrictions:  No data recorded No data recorded No data recorded      TREATMENT     Exercises:  Therapeutic exercise (CPT 78238)   Treatment Reasoning    Exercise 1: Assess leg length     mostly even possibly left longer  Exercise 2: Isometric hip extension from SKTC  R x 5  Exercise 3: Isometric hip flexion from 90/90   L  x 5  Exercise 4: TA contraction (alone x 5 with 5 second, UE, LE, UE/LE  x 10 each)  Exercise 5: Pelvic tilt on towel  x 10  Exercise 6: Hooklying lumbar rotation  x 10  Exercise 7: Supine quadratus stretch  x 4  with 15 second hold  Exercise 8: Supine piriformis stretch (fig 4, knee to opp chest)   x 4 with 10 second hold  Exercise 9: SKTC/DKTC--5 reps, 5\" hold  Exercise 10: Bridging x 10/Single leg L  x 5  Exercise 11: Sitting fwd reach (to R, to L)  x 5 with 5 sec hold  Exercise 12: Sitting L hip retraction x 5 with 5 sec hold  Exercise 13: Repeated sit to stand with TA contraction  x 10  Exercise

## 2024-01-09 ENCOUNTER — HOSPITAL ENCOUNTER (OUTPATIENT)
Dept: PHYSICAL THERAPY | Age: 24
Setting detail: THERAPIES SERIES
Discharge: HOME | End: 2024-01-09
Payer: MEDICAID

## 2024-01-09 PROCEDURE — 97110 THERAPEUTIC EXERCISES: CPT

## 2024-01-09 PROCEDURE — 97012 MECHANICAL TRACTION THERAPY: CPT

## 2024-01-09 NOTE — PROGRESS NOTES
Physical Therapy: Daily Note   Patient: Geovany Armendariz (23 y.o. male)   Examination Date: 2024  Plan of Care/Certification Expiration Date: 24    No data recorded   :  2000 # of Visits since SOC:   5   MRN: [id number]  CSN: [id number] Start of Care Date:   2023   Insurance: Payor: [employer] / Plan: [facility] / Product Type: *No Product type* /   Insurance ID: [id number] - (Medicaid Managed) Secondary Insurance (if applicable):    Referring Physician: Marcus Case MD Ethan Walker, MD   PCP: Marcus Case MD Visits to Date/Visits Approved: 5 / 10    No Show/Cancelled Appts:   /       Medical Diagnosis: Sciatica, unspecified side [M54.30] Back pain  Treatment Diagnosis: Back pain        SUBJECTIVE EXAMINATION   Pain Level: Pain Screening  Patient Currently in Pain: Denies    Patient Comments: Subjective: Doing okay right now.  Seems that therapy is helping.  My numbness is about completely gone.        TREATMENT     Exercises:  Therapeutic exercise (CPT 96507)   Treatment Reasoning    Exercise 1: Assess leg length     mostly even possibly left longer  Exercise 2: Isometric hip extension from SKTC  R x 5  Exercise 3: Isometric hip flexion from 90/90   L  x 5  Exercise 4: TA contraction (alone x 10 with 5 second, UE, LE, UE/LE  x 10 each)  Exercise 5: Pelvic tilt on towel  10 x 3 sec  Exercise 6: Hooklying lumbar rotation  x 10  Exercise 7: Supine quadratus stretch  x 4  with 15 second hold  Exercise 8: Supine piriformis stretch (fig 4, knee to opp chest)   x 4 with 10 second hold  Exercise 9: SKTC/DKTC--5 reps, 5\" hold  Exercise 10: Bridging x 10/Single leg L  x 10  Exercise 11: Sitting fwd reach (to R, to L)  x 5 with 5 sec hold  Exercise 12: Sitting L hip retraction x 5 with 5 sec hold  Exercise 13: Repeated sit to stand with TA contraction  x 10  Exercise 14: Multifidus row/Paloff press  blue x 15  Exercise 15: Lat pulldowns--80#, 15 reps  Exercise 16: Standing march  x

## 2024-01-11 ENCOUNTER — HOSPITAL ENCOUNTER (OUTPATIENT)
Dept: PHYSICAL THERAPY | Age: 24
Setting detail: THERAPIES SERIES
Discharge: HOME | End: 2024-01-11
Payer: MEDICAID

## 2024-01-11 PROCEDURE — 97012 MECHANICAL TRACTION THERAPY: CPT

## 2024-01-11 PROCEDURE — 97110 THERAPEUTIC EXERCISES: CPT

## 2024-01-11 NOTE — PROGRESS NOTES
Daily Treatment Note  Date: 2024  Patient Name: Geovnay Armendariz  MRN: [id number]     :   2000    Referring Physician: Marcus Case MD Ethan Walker, MD   PCP: Marcus Case MD    Medical Diagnosis: Sciatica, unspecified side [M54.30] Back pain  Treatment Diagnosis: Back pain      Insurance: Payor: [employer] / Plan: [facility] / Product Type: *No Product type* /   Insurance ID: [id number] - (Medicaid Managed)    Subjective:   General  Diagnosis: Back pain  Referring Provider (secondary): Marcus Case MD  PT Insurance Information: [employer]  Total # of Visits Approved: 10  Total # of Visits to Date: 6  Plan of Care/Certification Expiration Date: 24  Progress Note Due Date: 24  Referring Provider (secondary): Marcus Case MD  Subjective: right now i'm not in any pain  Patient Currently in Pain: No       Treatment Activities:  Exercises:      Treatment Reasoning    Exercise 1: Assess leg length      :  even  Exercise 2: Isometric hip extension from SKTC  R x 5  Exercise 3: Isometric hip flexion from 90/90   L  x 5  Exercise 4: TA contraction (alone x 10 with 5 second, UE, LE, UE/LE  x 10 each)  Exercise 5: Pelvic tilt on towel  10 x 3 sec  Exercise 6: Hooklying lumbar rotation  x 10  Exercise 7: Supine quadratus stretch  x 4  with 15 second hold  Exercise 8: Supine piriformis stretch (fig 4, knee to opp chest)   x 4 with 10 second hold ea  Exercise 9: SKTC/DKTC--5 reps, 5\" hold  Exercise 10: Bridging x 10/Single leg L  x 10  Exercise 11: Sitting fwd reach (to R, to L)  x 5 with 5 sec hold  Exercise 12: Sitting L hip retraction x 5 with 5 sec hold  Exercise 13: Repeated sit to stand with TA contraction  x 10  Exercise 14: Multifidus row/Paloff press  blue x 15  Exercise 15: Lat pulldowns--80#, 15 reps  Exercise 16: Standing march  x 1 minute  Exercise 17: Standing hip abd/ext  x 10  Exercise 18: Mini squats x 15, no hands  Exercise 19: IPT  x 20 minutes    105

## 2024-01-15 RX ORDER — SERTRALINE HYDROCHLORIDE 100 MG/1
100 TABLET, FILM COATED ORAL DAILY
Qty: 90 TABLET | Refills: 2 | Status: SHIPPED | OUTPATIENT
Start: 2024-01-15

## 2024-01-16 ENCOUNTER — APPOINTMENT (OUTPATIENT)
Dept: PHYSICAL THERAPY | Age: 24
End: 2024-01-16
Payer: MEDICAID

## 2024-01-18 ENCOUNTER — HOSPITAL ENCOUNTER (OUTPATIENT)
Dept: PHYSICAL THERAPY | Age: 24
Setting detail: THERAPIES SERIES
End: 2024-01-18
Payer: MEDICAID

## 2024-06-06 RX ORDER — TRAZODONE HYDROCHLORIDE 100 MG/1
100 TABLET ORAL NIGHTLY
Qty: 90 TABLET | Refills: 1 | Status: SHIPPED | OUTPATIENT
Start: 2024-06-06

## 2024-06-15 DIAGNOSIS — F51.5 NIGHTMARE: ICD-10-CM

## 2024-06-17 RX ORDER — PRAZOSIN HYDROCHLORIDE 5 MG/1
5 CAPSULE ORAL
Qty: 90 CAPSULE | Refills: 1 | Status: SHIPPED | OUTPATIENT
Start: 2024-06-17

## 2024-06-26 ENCOUNTER — TELEMEDICINE (OUTPATIENT)
Dept: PRIMARY CARE CLINIC | Age: 24
End: 2024-06-26
Payer: MEDICAID

## 2024-06-26 DIAGNOSIS — Z56.0: Primary | ICD-10-CM

## 2024-06-26 PROCEDURE — 99212 OFFICE O/P EST SF 10 MIN: CPT | Performed by: FAMILY MEDICINE

## 2024-06-26 PROCEDURE — 1036F TOBACCO NON-USER: CPT | Performed by: FAMILY MEDICINE

## 2024-06-26 PROCEDURE — G8427 DOCREV CUR MEDS BY ELIG CLIN: HCPCS | Performed by: FAMILY MEDICINE

## 2024-06-26 PROCEDURE — G8417 CALC BMI ABV UP PARAM F/U: HCPCS | Performed by: FAMILY MEDICINE

## 2024-06-26 SDOH — ECONOMIC STABILITY - INCOME SECURITY: UNEMPLOYMENT, UNSPECIFIED: Z56.0

## 2024-06-26 SDOH — ECONOMIC STABILITY: FOOD INSECURITY: WITHIN THE PAST 12 MONTHS, THE FOOD YOU BOUGHT JUST DIDN'T LAST AND YOU DIDN'T HAVE MONEY TO GET MORE.: NEVER TRUE

## 2024-06-26 SDOH — ECONOMIC STABILITY: FOOD INSECURITY: WITHIN THE PAST 12 MONTHS, YOU WORRIED THAT YOUR FOOD WOULD RUN OUT BEFORE YOU GOT MONEY TO BUY MORE.: NEVER TRUE

## 2024-06-26 SDOH — ECONOMIC STABILITY: INCOME INSECURITY: HOW HARD IS IT FOR YOU TO PAY FOR THE VERY BASICS LIKE FOOD, HOUSING, MEDICAL CARE, AND HEATING?: NOT HARD AT ALL

## 2024-06-26 ASSESSMENT — PATIENT HEALTH QUESTIONNAIRE - PHQ9
SUM OF ALL RESPONSES TO PHQ QUESTIONS 1-9: 0
SUM OF ALL RESPONSES TO PHQ9 QUESTIONS 1 & 2: 0
9. THOUGHTS THAT YOU WOULD BE BETTER OFF DEAD, OR OF HURTING YOURSELF: NOT AT ALL
SUM OF ALL RESPONSES TO PHQ QUESTIONS 1-9: 0
4. FEELING TIRED OR HAVING LITTLE ENERGY: NOT AT ALL
7. TROUBLE CONCENTRATING ON THINGS, SUCH AS READING THE NEWSPAPER OR WATCHING TELEVISION: NOT AT ALL
10. IF YOU CHECKED OFF ANY PROBLEMS, HOW DIFFICULT HAVE THESE PROBLEMS MADE IT FOR YOU TO DO YOUR WORK, TAKE CARE OF THINGS AT HOME, OR GET ALONG WITH OTHER PEOPLE: NOT DIFFICULT AT ALL
5. POOR APPETITE OR OVEREATING: NOT AT ALL
3. TROUBLE FALLING OR STAYING ASLEEP: NOT AT ALL
2. FEELING DOWN, DEPRESSED OR HOPELESS: NOT AT ALL
1. LITTLE INTEREST OR PLEASURE IN DOING THINGS: NOT AT ALL
SUM OF ALL RESPONSES TO PHQ QUESTIONS 1-9: 0
SUM OF ALL RESPONSES TO PHQ QUESTIONS 1-9: 0
8. MOVING OR SPEAKING SO SLOWLY THAT OTHER PEOPLE COULD HAVE NOTICED. OR THE OPPOSITE, BEING SO FIGETY OR RESTLESS THAT YOU HAVE BEEN MOVING AROUND A LOT MORE THAN USUAL: NOT AT ALL
6. FEELING BAD ABOUT YOURSELF - OR THAT YOU ARE A FAILURE OR HAVE LET YOURSELF OR YOUR FAMILY DOWN: NOT AT ALL

## 2024-06-26 ASSESSMENT — ENCOUNTER SYMPTOMS
ABDOMINAL PAIN: 0
SHORTNESS OF BREATH: 0
BLOOD IN STOOL: 0
CHEST TIGHTNESS: 0
WHEEZING: 0

## 2024-06-26 NOTE — PROGRESS NOTES
Geovany Armendariz, was evaluated through a synchronous (real-time) audio-video encounter. The patient (or guardian if applicable) is aware that this is a billable service, which includes applicable co-pays. This Virtual Visit was conducted with patient's (and/or legal guardian's) consent. Patient identification was verified, and a caregiver was present when appropriate.   The patient was located at Home: [address] [place] [address] [place] [address]  Provider was located at Facility (Appt Dept): [address] [place], [place] [id number]  Confirm you are appropriately licensed, registered, or certified to deliver care in the state where the patient is located as indicated above. If you are not or unsure, please re-schedule the visit: Yes, I confirm.     Geovany Armendariz (:  2000) is a Established patient, presenting virtually for evaluation of the following:    Assessment & Plan   Below is the assessment and plan developed based on review of pertinent history, physical exam, labs, studies, and medications.  1. Seeking work      Patient will continue trazodone at this time.  I discussed with patient that I think that the risk is minimal but never 0 and so long as he has been stable on this medication and does not take it while working he should be safe to work in that environment.  Letter has been written and is in patient's chart.  We will fax this to his employer  Patient otherwise doing well      Return if symptoms worsen or fail to improve.       Subjective   Geovany Armendariz is a 24 y.o. 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  Patient currently takes trazodone for sleep nightly and states he has minimal drowsiness the following day.  Patient has been taking this for years and has been stable on it.  Patient usually takes this around 9 PM.  Patient denies any side effects or issues with the medication.  No

## 2024-07-29 RX ORDER — SERTRALINE HYDROCHLORIDE 100 MG/1
100 TABLET, FILM COATED ORAL DAILY
Qty: 90 TABLET | Refills: 2 | Status: SHIPPED | OUTPATIENT
Start: 2024-07-29

## 2024-10-29 ENCOUNTER — TELEPHONE (OUTPATIENT)
Dept: FAMILY MEDICINE CLINIC | Facility: CLINIC | Age: 24
End: 2024-10-29
Payer: COMMERCIAL

## 2024-10-29 NOTE — TELEPHONE ENCOUNTER
Left message for patient to call office about his upcoming appointment. [facility] for him to see our office. Calling to see if he is trying to use [facility] or if he is using a differenent insurance. Hub to transfer.

## 2024-10-30 NOTE — TELEPHONE ENCOUNTER
Left message for patient to call office regarding insurance information. [facility] hasn't sent a referral for him. Hub to transfer.

## 2024-10-31 ENCOUNTER — OFFICE VISIT (OUTPATIENT)
Dept: FAMILY MEDICINE CLINIC | Facility: CLINIC | Age: 24
End: 2024-10-31
Payer: COMMERCIAL

## 2024-10-31 VITALS
WEIGHT: 287 LBS | BODY MASS INDEX: 40.18 KG/M2 | TEMPERATURE: 97.5 F | OXYGEN SATURATION: 97 % | HEIGHT: 71 IN | SYSTOLIC BLOOD PRESSURE: 127 MMHG | RESPIRATION RATE: 16 BRPM | HEART RATE: 68 BPM | DIASTOLIC BLOOD PRESSURE: 86 MMHG

## 2024-10-31 DIAGNOSIS — Z02.83 ENCOUNTER FOR DRUG SCREENING: ICD-10-CM

## 2024-10-31 DIAGNOSIS — Z01.84 IMMUNITY STATUS TESTING: ICD-10-CM

## 2024-10-31 DIAGNOSIS — F43.10 PTSD (POST-TRAUMATIC STRESS DISORDER): ICD-10-CM

## 2024-10-31 DIAGNOSIS — Z23 ENCOUNTER FOR IMMUNIZATION: Primary | ICD-10-CM

## 2024-10-31 DIAGNOSIS — F41.9 ANXIETY: ICD-10-CM

## 2024-10-31 DIAGNOSIS — F32.89 OTHER DEPRESSION: ICD-10-CM

## 2024-10-31 RX ORDER — VENLAFAXINE HYDROCHLORIDE 75 MG/1
75 CAPSULE, EXTENDED RELEASE ORAL DAILY
Qty: 30 CAPSULE | Refills: 3 | Status: SHIPPED | OUTPATIENT
Start: 2024-10-31

## 2024-10-31 NOTE — PROGRESS NOTES
"Chief Complaint  Establish Care (Pt is here to establish care)    Subjective        Chris Hernandez presents to [facility]  History of Present Illness  Mr. Hernandez presents to establish care.      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  He says he had all of his vaccines, but does not know where most of them were.    Requirements are:  UDS, TB testing, MMR titers, VZV titers, Hep B titers, TDAP vaccine or titers, flu vaccines.      He has anxiety, depression, and PTSD.  He has been on Zoloft and Trazodone in the past.  He would like to inquire about genesight test.      Objective   Vital Signs:  /86 (BP Location: Left arm, Patient Position: Sitting, Cuff Size: Adult)   Pulse 68   Temp 97.5 °F (36.4 °C) (Temporal)   Resp 16   Ht 180.3 cm (71\")   Wt 130 kg (287 lb)   SpO2 97%   BMI 40.03 kg/m²   Estimated body mass index is 40.03 kg/m² as calculated from the following:    Height as of this encounter: 180.3 cm (71\").    Weight as of this encounter: 130 kg (287 lb).    Class 3 Severe Obesity (BMI >=40). Obesity-related health conditions include the following: none. Obesity is newly identified. BMI is is above average; BMI management plan is completed. We discussed portion control and increasing exercise.      Physical Exam  Vitals reviewed.   Constitutional:       General: He is not in acute distress.     Appearance: Normal appearance. He is normal weight. He is not ill-appearing, toxic-appearing or diaphoretic.   HENT:      Head: Normocephalic and atraumatic.      Right Ear: External ear normal.      Left Ear: External ear normal.      Nose: Nose normal.   Eyes:      Extraocular Movements: Extraocular movements intact.   Cardiovascular:      Rate and Rhythm: Normal rate and regular rhythm.   Pulmonary:      Effort: Pulmonary effort is normal. No respiratory distress.      Breath sounds: Normal breath sounds.   Skin:     General: Skin is warm and dry.      " Coloration: Skin is not jaundiced or pale.   Neurological:      Mental Status: He is alert and oriented to person, place, and time.   Psychiatric:         Mood and Affect: Mood normal.         Behavior: Behavior normal.         Thought Content: Thought content normal.         Judgment: Judgment normal.            Result Review :                Assessment and Plan   Diagnoses and all orders for this visit:    1. Encounter for immunization (Primary)  -     Fluzone >6mos ([id number])    2. Immunity status testing  -     Measles / Mumps / Rubella Immunity  -     Varicella zoster antibody, IgG  -     Hepatitis B Surface Antibody  -     Tetanus Toxoid, IgG; Future  -     Diphtheria Antitoxoid Ab  -     B Pertussis IgG Ab    3. Encounter for drug screening  -     ToxASSURE Select 13 (MW) - Urine, Clean Catch    4. Anxiety  -     GeneSight - Swab,; Future  -     venlafaxine XR (Effexor XR) 75 MG 24 hr capsule; Take 1 capsule by mouth Daily.  Dispense: 30 capsule; Refill: 3    5. Other depression  -     GeneSight - Swab,; Future  -     venlafaxine XR (Effexor XR) 75 MG 24 hr capsule; Take 1 capsule by mouth Daily.  Dispense: 30 capsule; Refill: 3    6. PTSD (post-traumatic stress disorder)  -     GeneSight - Swab,; Future  -     venlafaxine XR (Effexor XR) 75 MG 24 hr capsule; Take 1 capsule by mouth Daily.  Dispense: 30 capsule; Refill: 3    Other orders  -     Cancel: QuantiFERON TB Gold    Will check Titers  Flu shot administered  Will start Effexor  Will get Toxassure  Will get gene sight testing  Follow up in 1 week for ADHD evaluation          Follow Up   Return in about 1 week (around 11/7/2024) for Recheck.  Patient was given instructions and counseling regarding his condition or for health maintenance advice. Please see specific information pulled into the AVS if appropriate.

## 2024-11-05 ENCOUNTER — OFFICE VISIT (OUTPATIENT)
Dept: FAMILY MEDICINE CLINIC | Facility: CLINIC | Age: 24
End: 2024-11-05
Payer: COMMERCIAL

## 2024-11-05 VITALS
SYSTOLIC BLOOD PRESSURE: 117 MMHG | WEIGHT: 294 LBS | DIASTOLIC BLOOD PRESSURE: 81 MMHG | HEART RATE: 67 BPM | HEIGHT: 71 IN | TEMPERATURE: 98 F | RESPIRATION RATE: 16 BRPM | BODY MASS INDEX: 41.16 KG/M2 | OXYGEN SATURATION: 99 %

## 2024-11-05 DIAGNOSIS — F90.9 ATTENTION DEFICIT HYPERACTIVITY DISORDER (ADHD), UNSPECIFIED ADHD TYPE: Primary | ICD-10-CM

## 2024-11-05 DIAGNOSIS — Z00.00 ANNUAL PHYSICAL EXAM: Primary | ICD-10-CM

## 2024-11-05 DIAGNOSIS — Z13.0 SCREENING FOR DEFICIENCY ANEMIA: ICD-10-CM

## 2024-11-05 DIAGNOSIS — Z13.220 SCREENING FOR LIPID DISORDERS: ICD-10-CM

## 2024-11-05 DIAGNOSIS — Z11.59 NEED FOR HEPATITIS C SCREENING TEST: ICD-10-CM

## 2024-11-05 DIAGNOSIS — Z51.81 THERAPEUTIC DRUG MONITORING: ICD-10-CM

## 2024-11-05 DIAGNOSIS — E66.01 SEVERE OBESITY (BMI >= 40): ICD-10-CM

## 2024-11-05 LAB
B PERT IGG SER IA-ACNC: <0.95 INDEX (ref 0–0.94)
C DIPHTHERIAE AB SER IA-ACNC: 0.32 IU/ML
C TETANI TOXOID IGG SERPL IA-ACNC: 0.66 IU/ML
HBV SURFACE AB SER QL: REACTIVE
MEV IGG SER IA-ACNC: 20.2 AU/ML
MUV IGG SER IA-ACNC: 154 AU/ML
RUBV IGG SERPL IA-ACNC: 3.26 INDEX
VZV IGG SER QL IA: REACTIVE

## 2024-11-05 PROCEDURE — 1126F AMNT PAIN NOTED NONE PRSNT: CPT | Performed by: FAMILY MEDICINE

## 2024-11-05 PROCEDURE — 99395 PREV VISIT EST AGE 18-39: CPT | Performed by: FAMILY MEDICINE

## 2024-11-05 PROCEDURE — 2014F MENTAL STATUS ASSESS: CPT | Performed by: FAMILY MEDICINE

## 2024-11-05 RX ORDER — DEXTROAMPHETAMINE SACCHARATE, AMPHETAMINE ASPARTATE MONOHYDRATE, DEXTROAMPHETAMINE SULFATE AND AMPHETAMINE SULFATE 5; 5; 5; 5 MG/1; MG/1; MG/1; MG/1
20 CAPSULE, EXTENDED RELEASE ORAL EVERY MORNING
Qty: 30 CAPSULE | Refills: 0 | Status: SHIPPED | OUTPATIENT
Start: 2024-11-05 | End: 2024-12-05

## 2024-11-05 NOTE — PROGRESS NOTES
"Chief Complaint  Annual Exam (Pt is here for a physical )    Subjective        Chris Hernandez presents to [facility]  History of Present Illness    Chronic Problems include:     Diet: He says he follows a carnivore diet    Exercise:  he says he gets a moderate amount, he is doing Paramedic training and getting more exercise with that    Tobacco:  None    Alcohol:  No regular use, socially    No Recreational Drug use    Objective   Vital Signs:  There were no vitals taken for this visit.  Estimated body mass index is 41 kg/m² as calculated from the following:    Height as of an earlier encounter on 11/5/24: 180.3 cm (71\").    Weight as of an earlier encounter on 11/5/24: 133 kg (294 lb).               Physical Exam  Vitals reviewed.   Constitutional:       General: He is not in acute distress.     Appearance: Normal appearance. He is normal weight. He is not ill-appearing, toxic-appearing or diaphoretic.   HENT:      Head: Normocephalic and atraumatic.      Right Ear: External ear normal.      Left Ear: External ear normal.      Nose: Nose normal.   Eyes:      Extraocular Movements: Extraocular movements intact.   Cardiovascular:      Rate and Rhythm: Normal rate and regular rhythm.   Pulmonary:      Effort: Pulmonary effort is normal. No respiratory distress.      Breath sounds: Normal breath sounds.   Skin:     General: Skin is warm and dry.      Coloration: Skin is not jaundiced or pale.   Neurological:      Mental Status: He is alert and oriented to person, place, and time.   Psychiatric:         Mood and Affect: Mood normal.         Behavior: Behavior normal.         Thought Content: Thought content normal.         Judgment: Judgment normal.            Result Review :                     Assessment and Plan     Diagnoses and all orders for this visit:    1. Annual physical exam (Primary)  -     CBC (No Diff)  -     Comprehensive Metabolic Panel  -     TSH  -     Lipid Panel    2. " Screening for deficiency anemia  -     CBC (No Diff)    3. Screening for lipid disorders  -     Lipid Panel    4. Severe obesity (BMI >= 40)  -     Comprehensive Metabolic Panel  -     TSH  -     Lipid Panel    5. Need for hepatitis C screening test  -     Hepatitis C Antibody        Counseled on diet and exercise  Counseled on lifestyle modifications  Counseled on vaccines  Will check screening labs           Follow Up     No follow-ups on file.  Patient was given instructions and counseling regarding his condition or for health maintenance advice. Please see specific information pulled into the AVS if appropriate.

## 2024-11-05 NOTE — PROGRESS NOTES
"Chief Complaint  ADHD (Pt is here for ADHD)    Subjective        Chris Hernandez presents to [facility]  History of Present Illness  Mr. Hernandez presents for evaluation for ADHD.    He recently started paramedic school.  He has a hard time focusing and studying to learn.  He has a hard time staying on task.    He has a hard time focusing on movies/tv.  He gets lost in conversations.      He loses things frequently:  his keys, wallet, sunglasses, tv remote.    He sometimes misses turns/exits while driving.      Objective   Vital Signs:  /81 (BP Location: Left arm, Patient Position: Sitting, Cuff Size: Adult)   Pulse 67   Temp 98 °F (36.7 °C) (Temporal)   Resp 16   Ht 180.3 cm (71\")   Wt 133 kg (294 lb)   SpO2 99%   BMI 41.00 kg/m²   Estimated body mass index is 41 kg/m² as calculated from the following:    Height as of this encounter: 180.3 cm (71\").    Weight as of this encounter: 133 kg (294 lb).            Physical Exam  Vitals reviewed.   Constitutional:       General: He is not in acute distress.     Appearance: Normal appearance. He is normal weight. He is not ill-appearing, toxic-appearing or diaphoretic.   HENT:      Head: Normocephalic and atraumatic.      Right Ear: External ear normal.      Left Ear: External ear normal.      Nose: Nose normal.   Eyes:      Extraocular Movements: Extraocular movements intact.   Cardiovascular:      Rate and Rhythm: Normal rate and regular rhythm.   Pulmonary:      Effort: Pulmonary effort is normal. No respiratory distress.      Breath sounds: Normal breath sounds.   Skin:     General: Skin is warm and dry.      Coloration: Skin is not jaundiced or pale.   Neurological:      Mental Status: He is alert and oriented to person, place, and time.   Psychiatric:         Mood and Affect: Mood normal.         Behavior: Behavior normal.         Thought Content: Thought content normal.         Judgment: Judgment normal.            Result " Review :                Assessment and Plan   Diagnoses and all orders for this visit:    1. Attention deficit hyperactivity disorder (ADHD), unspecified ADHD type (Primary)  -     amphetamine-dextroamphetamine XR (Adderall XR) 20 MG 24 hr capsule; Take 1 capsule by mouth Every Morning for 30 days  Dispense: 30 capsule; Refill: 0    2. Therapeutic drug monitoring    Will get controlled contract and UDS  Start Adderall  Follow up in 1 month to re-assess         Follow Up   Return in about 1 month (around 12/5/2024) for Recheck, Annual physical.  Patient was given instructions and counseling regarding his condition or for health maintenance advice. Please see specific information pulled into the AVS if appropriate.

## 2024-11-06 LAB
ALBUMIN SERPL-MCNC: 4.5 G/DL (ref 3.5–5.2)
ALBUMIN/GLOB SERPL: 1.6 G/DL
ALP SERPL-CCNC: 63 U/L (ref 39–117)
ALT SERPL-CCNC: 16 U/L (ref 1–41)
AST SERPL-CCNC: 16 U/L (ref 1–40)
BILIRUB SERPL-MCNC: 0.5 MG/DL (ref 0–1.2)
BUN SERPL-MCNC: 13 MG/DL (ref 6–20)
BUN/CREAT SERPL: 11.1 (ref 7–25)
CALCIUM SERPL-MCNC: 9.7 MG/DL (ref 8.6–10.5)
CHLORIDE SERPL-SCNC: 100 MMOL/L (ref 98–107)
CHOLEST SERPL-MCNC: 222 MG/DL (ref 0–200)
CO2 SERPL-SCNC: 25.7 MMOL/L (ref 22–29)
CREAT SERPL-MCNC: 1.17 MG/DL (ref 0.76–1.27)
EGFRCR SERPLBLD CKD-EPI 2021: 88.2 ML/MIN/1.73
ERYTHROCYTE [DISTWIDTH] IN BLOOD BY AUTOMATED COUNT: 13.8 % (ref 12.3–15.4)
GLOBULIN SER CALC-MCNC: 2.9 GM/DL
GLUCOSE SERPL-MCNC: 85 MG/DL (ref 65–99)
HCT VFR BLD AUTO: 51.1 % (ref 37.5–51)
HCV IGG SERPL QL IA: NON REACTIVE
HDLC SERPL-MCNC: 36 MG/DL (ref 40–60)
HGB BLD-MCNC: 17.1 G/DL (ref 13–17.7)
LDLC SERPL CALC-MCNC: 161 MG/DL (ref 0–100)
MCH RBC QN AUTO: 28 PG (ref 26.6–33)
MCHC RBC AUTO-ENTMCNC: 33.5 G/DL (ref 31.5–35.7)
MCV RBC AUTO: 83.6 FL (ref 79–97)
PLATELET # BLD AUTO: 242 10*3/MM3 (ref 140–450)
POTASSIUM SERPL-SCNC: 4.6 MMOL/L (ref 3.5–5.2)
PROT SERPL-MCNC: 7.4 G/DL (ref 6–8.5)
RBC # BLD AUTO: 6.11 10*6/MM3 (ref 4.14–5.8)
SODIUM SERPL-SCNC: 137 MMOL/L (ref 136–145)
TRIGL SERPL-MCNC: 134 MG/DL (ref 0–150)
TSH SERPL DL<=0.005 MIU/L-ACNC: 2.52 UIU/ML (ref 0.27–4.2)
VLDLC SERPL CALC-MCNC: 25 MG/DL (ref 5–40)
WBC # BLD AUTO: 9.61 10*3/MM3 (ref 3.4–10.8)

## 2024-11-07 ENCOUNTER — TELEPHONE (OUTPATIENT)
Dept: FAMILY MEDICINE CLINIC | Facility: CLINIC | Age: 24
End: 2024-11-07
Payer: COMMERCIAL

## 2024-11-07 NOTE — TELEPHONE ENCOUNTER
----- Message from Aroldo Tuttle sent at 11/6/2024  9:43 AM CST -----  Let him know:  Cholesterol elevated, continue to work on diet and exercise.

## 2024-11-11 LAB — DRUGS UR: NORMAL

## 2024-12-02 ENCOUNTER — PATIENT MESSAGE (OUTPATIENT)
Dept: FAMILY MEDICINE CLINIC | Facility: CLINIC | Age: 24
End: 2024-12-02
Payer: COMMERCIAL

## 2024-12-07 DIAGNOSIS — F43.10 PTSD (POST-TRAUMATIC STRESS DISORDER): ICD-10-CM

## 2024-12-07 DIAGNOSIS — F90.9 ATTENTION DEFICIT HYPERACTIVITY DISORDER (ADHD), UNSPECIFIED ADHD TYPE: ICD-10-CM

## 2024-12-07 DIAGNOSIS — F41.9 ANXIETY: ICD-10-CM

## 2024-12-07 DIAGNOSIS — F32.89 OTHER DEPRESSION: ICD-10-CM

## 2024-12-09 DIAGNOSIS — Z01.84 IMMUNITY STATUS TESTING: ICD-10-CM

## 2024-12-09 DIAGNOSIS — F32.89 OTHER DEPRESSION: ICD-10-CM

## 2024-12-09 RX ORDER — VENLAFAXINE HYDROCHLORIDE 75 MG/1
75 CAPSULE, EXTENDED RELEASE ORAL DAILY
Qty: 30 CAPSULE | Refills: 3 | OUTPATIENT
Start: 2024-12-09

## 2024-12-09 RX ORDER — DEXTROAMPHETAMINE SACCHARATE, AMPHETAMINE ASPARTATE MONOHYDRATE, DEXTROAMPHETAMINE SULFATE AND AMPHETAMINE SULFATE 5; 5; 5; 5 MG/1; MG/1; MG/1; MG/1
20 CAPSULE, EXTENDED RELEASE ORAL EVERY MORNING
Qty: 30 CAPSULE | Refills: 0 | OUTPATIENT
Start: 2024-12-09 | End: 2025-01-08

## 2024-12-09 NOTE — TELEPHONE ENCOUNTER
Was supposed to be 1 month follow up since we just started the meds, see if he can come in tomorrow

## 2024-12-09 NOTE — TELEPHONE ENCOUNTER
Rx Refill Note  Requested Prescriptions     Pending Prescriptions Disp Refills    venlafaxine XR (Effexor XR) 75 MG 24 hr capsule 30 capsule 3     Sig: Take 1 capsule by mouth Daily.    amphetamine-dextroamphetamine XR (Adderall XR) 20 MG 24 hr capsule 30 capsule 0     Sig: Take 1 capsule by mouth Every Morning for 30 days      Last office visit with prescribing clinician: 11/5/2024   Last telemedicine visit with prescribing clinician: Visit date not found   Next office visit with prescribing clinician: 2/5/2025                         Would you like a call back once the refill request has been completed: [] Yes [] No    If the office needs to give you a call back, can they leave a voicemail: [] Yes [] No    Ledy Schneider MA  12/09/24, 15:45 CST

## 2024-12-13 ENCOUNTER — TELEPHONE (OUTPATIENT)
Dept: FAMILY MEDICINE CLINIC | Facility: CLINIC | Age: 24
End: 2024-12-13
Payer: COMMERCIAL

## 2024-12-13 ENCOUNTER — TELEMEDICINE (OUTPATIENT)
Dept: FAMILY MEDICINE CLINIC | Facility: CLINIC | Age: 24
End: 2024-12-13
Payer: COMMERCIAL

## 2024-12-13 DIAGNOSIS — F41.9 ANXIETY: Primary | ICD-10-CM

## 2024-12-13 DIAGNOSIS — F90.9 ATTENTION DEFICIT HYPERACTIVITY DISORDER (ADHD), UNSPECIFIED ADHD TYPE: ICD-10-CM

## 2024-12-13 PROCEDURE — 99214 OFFICE O/P EST MOD 30 MIN: CPT | Performed by: FAMILY MEDICINE

## 2024-12-13 PROCEDURE — 1126F AMNT PAIN NOTED NONE PRSNT: CPT | Performed by: FAMILY MEDICINE

## 2024-12-13 RX ORDER — DEXTROAMPHETAMINE SACCHARATE, AMPHETAMINE ASPARTATE MONOHYDRATE, DEXTROAMPHETAMINE SULFATE AND AMPHETAMINE SULFATE 6.25; 6.25; 6.25; 6.25 MG/1; MG/1; MG/1; MG/1
25 CAPSULE, EXTENDED RELEASE ORAL EVERY MORNING
Qty: 30 CAPSULE | Refills: 0 | Status: SHIPPED | OUTPATIENT
Start: 2024-12-13

## 2024-12-13 RX ORDER — DESVENLAFAXINE 50 MG/1
50 TABLET, FILM COATED, EXTENDED RELEASE ORAL DAILY
Qty: 30 TABLET | Refills: 0 | Status: SHIPPED | OUTPATIENT
Start: 2024-12-13

## 2024-12-13 NOTE — PROGRESS NOTES
"Chief Complaint  No chief complaint on file.    Subjective        Chris Hernandez presents to [facility]  History of Present Illness  Mr. Hernandez presents today via video visit for a follow up on ADHD and Anxiety.      He has ADHD.  We started him on Adderall XR 20 mg PO daily.  He says he has not been able to tell a huge difference.  He says his focus is slightly better.  No side effects.      He has anxiety.  We started him on Effexor a month ago.  This has not helped.  He was previously on Zoloft.  This also did not help.  We previously did Gene-sight testing.  This suggests Pristiq should work well for him.  He would like to try this.      Objective   Vital Signs:  There were no vitals taken for this visit.  Estimated body mass index is 41 kg/m² as calculated from the following:    Height as of 11/5/24: 180.3 cm (71\").    Weight as of 11/5/24: 133 kg (294 lb).            Physical Exam  Constitutional:       General: He is not in acute distress.     Appearance: Normal appearance. He is not ill-appearing, toxic-appearing or diaphoretic.   HENT:      Head: Normocephalic and atraumatic.      Right Ear: External ear normal.      Left Ear: External ear normal.      Nose: Nose normal.   Eyes:      Extraocular Movements: Extraocular movements intact.   Pulmonary:      Effort: Pulmonary effort is normal. No respiratory distress.   Skin:     General: Skin is dry.      Coloration: Skin is not jaundiced or pale.   Neurological:      Mental Status: He is alert and oriented to person, place, and time.   Psychiatric:         Mood and Affect: Mood normal.         Behavior: Behavior normal.         Thought Content: Thought content normal.         Judgment: Judgment normal.        Result Review :                Assessment and Plan   Diagnoses and all orders for this visit:    1. Anxiety (Primary)  -     desvenlafaxine (Pristiq) 50 MG 24 hr tablet; Take 1 tablet by mouth Daily.  Dispense: 30 tablet; " Refill: 0    2. Attention deficit hyperactivity disorder (ADHD), unspecified ADHD type  -     amphetamine-dextroamphetamine XR (Adderall XR) 25 MG 24 hr capsule; Take 1 capsule by mouth Every Morning  Dispense: 30 capsule; Refill: 0    Increase Adderall  Stop Effexor  Start Pristiq  Follow up in 1 month    Reviewed [place] which is appropriate  Reviewed UDS from 10/31/24 which is appropriate  Controlled contract current as of 11/5/24         Follow Up   No follow-ups on file.  Patient was given instructions and counseling regarding his condition or for health maintenance advice. Please see specific information pulled into the AVS if appropriate.     Mode of Visit: Video  Location of patient: -HOME-  Location of provider: +[facility]+  You have chosen to receive care through a telehealth visit.  The patient has signed the video visit consent form.  The visit included audio and video interaction. No technical issues occurred during this visit.

## 2024-12-13 NOTE — TELEPHONE ENCOUNTER
----- Message from Aroldo Tuttle sent at 12/9/2024  9:52 AM CST -----  Let him know:  Gene sight testing is back.  It shows the Effexor is metabolized or broken down more slowly in his body.  At higher doses, this would make him more likely to have side effects.  We're on a relatively low dose now.      We were supposed to see him back this month for ADHD.  See if he's able to come in the next few days and we can follow up on this and the ADHD

## 2025-02-07 ENCOUNTER — TELEMEDICINE (OUTPATIENT)
Dept: FAMILY MEDICINE CLINIC | Facility: CLINIC | Age: 25
End: 2025-02-07
Payer: COMMERCIAL

## 2025-02-07 DIAGNOSIS — F41.9 ANXIETY: ICD-10-CM

## 2025-02-07 DIAGNOSIS — F90.9 ATTENTION DEFICIT HYPERACTIVITY DISORDER (ADHD), UNSPECIFIED ADHD TYPE: Primary | ICD-10-CM

## 2025-02-07 PROCEDURE — 99214 OFFICE O/P EST MOD 30 MIN: CPT | Performed by: FAMILY MEDICINE

## 2025-02-07 PROCEDURE — 1126F AMNT PAIN NOTED NONE PRSNT: CPT | Performed by: FAMILY MEDICINE

## 2025-02-07 RX ORDER — DEXTROAMPHETAMINE SACCHARATE, AMPHETAMINE ASPARTATE MONOHYDRATE, DEXTROAMPHETAMINE SULFATE AND AMPHETAMINE SULFATE 7.5; 7.5; 7.5; 7.5 MG/1; MG/1; MG/1; MG/1
30 CAPSULE, EXTENDED RELEASE ORAL EVERY MORNING
Qty: 30 CAPSULE | Refills: 0 | Status: SHIPPED | OUTPATIENT
Start: 2025-02-07

## 2025-02-07 RX ORDER — DESVENLAFAXINE 50 MG/1
50 TABLET, FILM COATED, EXTENDED RELEASE ORAL DAILY
Qty: 90 TABLET | Refills: 1 | Status: SHIPPED | OUTPATIENT
Start: 2025-02-07

## 2025-02-08 NOTE — PROGRESS NOTES
"Chief Complaint  ADHD    Subjective        Chris Hernandez presents to [facility]  History of Present Illness  Mr. Hernandez presents for a follow up on ADHD.    He has ADHD.  He is on Adderall XR 25 mg.  He says this has helped improve things, but he still has trouble focusing at times.  No side effects.      He has Anxiety.  He is on Pristiq.  He says this helps control his anxiety.        Objective   Vital Signs:  There were no vitals taken for this visit.  Estimated body mass index is 41 kg/m² as calculated from the following:    Height as of 11/5/24: 180.3 cm (71\").    Weight as of 11/5/24: 133 kg (294 lb).            Physical Exam  Vitals reviewed.   Constitutional:       General: He is not in acute distress.     Appearance: Normal appearance. He is normal weight. He is not ill-appearing, toxic-appearing or diaphoretic.   HENT:      Head: Normocephalic and atraumatic.      Right Ear: External ear normal.      Left Ear: External ear normal.      Nose: Nose normal.   Eyes:      Extraocular Movements: Extraocular movements intact.   Cardiovascular:      Rate and Rhythm: Normal rate and regular rhythm.   Pulmonary:      Effort: Pulmonary effort is normal. No respiratory distress.      Breath sounds: Normal breath sounds.   Skin:     General: Skin is warm and dry.      Coloration: Skin is not jaundiced or pale.   Neurological:      Mental Status: He is alert and oriented to person, place, and time.   Psychiatric:         Mood and Affect: Mood normal.         Behavior: Behavior normal.         Thought Content: Thought content normal.         Judgment: Judgment normal.            Result Review :                Assessment and Plan   Diagnoses and all orders for this visit:    1. Attention deficit hyperactivity disorder (ADHD), unspecified ADHD type (Primary)  -     amphetamine-dextroamphetamine XR (Adderall XR) 30 MG 24 hr capsule; Take 1 capsule by mouth Every Morning  Dispense: 30 " capsule; Refill: 0    2. Anxiety  -     desvenlafaxine (Pristiq) 50 MG 24 hr tablet; Take 1 tablet by mouth Daily.  Dispense: 90 tablet; Refill: 1    Increase Adderall  CC and UDS on file and up to date  Follow up in 1 month         Follow Up   No follow-ups on file.  Patient was given instructions and counseling regarding his condition or for health maintenance advice. Please see specific information pulled into the AVS if appropriate.     Mode of Visit: Video  Location of patient: -HOME-  Location of provider: +[facility]  You have chosen to receive care through a telehealth visit.  The patient has signed the video visit consent form.  The visit included audio and video interaction. No technical issues occurred during this visit.

## 2025-04-01 ENCOUNTER — OFFICE VISIT (OUTPATIENT)
Dept: FAMILY MEDICINE CLINIC | Facility: CLINIC | Age: 25
End: 2025-04-01
Payer: COMMERCIAL

## 2025-04-01 VITALS
BODY MASS INDEX: 41.02 KG/M2 | DIASTOLIC BLOOD PRESSURE: 84 MMHG | OXYGEN SATURATION: 99 % | TEMPERATURE: 98 F | HEART RATE: 99 BPM | HEIGHT: 71 IN | WEIGHT: 293 LBS | RESPIRATION RATE: 20 BRPM | SYSTOLIC BLOOD PRESSURE: 138 MMHG

## 2025-04-01 DIAGNOSIS — G89.29 CHRONIC MIDLINE LOW BACK PAIN WITHOUT SCIATICA: ICD-10-CM

## 2025-04-01 DIAGNOSIS — M54.50 CHRONIC MIDLINE LOW BACK PAIN WITHOUT SCIATICA: ICD-10-CM

## 2025-04-01 DIAGNOSIS — M54.50 ACUTE MIDLINE LOW BACK PAIN, UNSPECIFIED WHETHER SCIATICA PRESENT: Primary | ICD-10-CM

## 2025-04-01 PROCEDURE — 99214 OFFICE O/P EST MOD 30 MIN: CPT | Performed by: FAMILY MEDICINE

## 2025-04-01 PROCEDURE — 1125F AMNT PAIN NOTED PAIN PRSNT: CPT | Performed by: FAMILY MEDICINE

## 2025-04-01 RX ORDER — METHYLPREDNISOLONE 4 MG/1
TABLET ORAL
Qty: 1 EACH | Refills: 0 | Status: SHIPPED | OUTPATIENT
Start: 2025-04-01

## 2025-04-01 NOTE — PROGRESS NOTES
"Chief Complaint  Back Pain (Pt is here for back pain )    Subjective        Chris Hernandez presents to [facility]  History of Present Illness  Mr. Hernandez presents today with concerns of back pain.      He does not know of/recall a recent injury.  He was previously in the .  He did a lot of heavy lifting.  He says it's in his lower back right above his tail bone.  The pain does not radiate.  He says he has had sciatica before.  This does NOT feel like that.      Objective   Vital Signs:  /84 (BP Location: Left arm, Patient Position: Sitting, Cuff Size: Adult)   Pulse 99   Temp 98 °F (36.7 °C) (Temporal)   Resp 20   Ht 180.3 cm (70.98\")   Wt 133 kg (293 lb)   SpO2 99%   BMI 40.88 kg/m²   Estimated body mass index is 40.88 kg/m² as calculated from the following:    Height as of this encounter: 180.3 cm (70.98\").    Weight as of this encounter: 133 kg (293 lb).            Physical Exam  Vitals reviewed.   Constitutional:       General: He is not in acute distress.     Appearance: Normal appearance. He is normal weight. He is not ill-appearing, toxic-appearing or diaphoretic.   HENT:      Head: Normocephalic and atraumatic.      Right Ear: External ear normal.      Left Ear: External ear normal.      Nose: Nose normal.   Eyes:      Extraocular Movements: Extraocular movements intact.   Cardiovascular:      Rate and Rhythm: Normal rate and regular rhythm.   Pulmonary:      Effort: Pulmonary effort is normal. No respiratory distress.      Breath sounds: Normal breath sounds.   Skin:     General: Skin is warm and dry.      Coloration: Skin is not jaundiced or pale.   Neurological:      Mental Status: He is alert and oriented to person, place, and time.   Psychiatric:         Mood and Affect: Mood normal.         Behavior: Behavior normal.         Thought Content: Thought content normal.         Judgment: Judgment normal.            Result Review :              "   Assessment and Plan   Diagnoses and all orders for this visit:    1. Acute midline low back pain, unspecified whether sciatica present (Primary)  -     methylPREDNISolone (MEDROL) 4 MG dose pack; Take as directed on package instructions.  Dispense: 1 each; Refill: 0  -     diclofenac (VOLTAREN) 50 MG EC tablet; Take 1 tablet by mouth 2 (Two) Times a Day As Needed (pain).  Dispense: 60 tablet; Refill: 1  -     XR Spine Lumbar 2 or 3 View (In Office)  -     Ambulatory Referral to Physical Therapy for Evaluation & Treatment    2. Chronic midline low back pain without sciatica  -     methylPREDNISolone (MEDROL) 4 MG dose pack; Take as directed on package instructions.  Dispense: 1 each; Refill: 0  -     diclofenac (VOLTAREN) 50 MG EC tablet; Take 1 tablet by mouth 2 (Two) Times a Day As Needed (pain).  Dispense: 60 tablet; Refill: 1  -     XR Spine Lumbar 2 or 3 View (In Office)  -     Ambulatory Referral to Physical Therapy for Evaluation & Treatment    This is an acute exacerbation of his chronic pain.    Will get plain films.  Will treat with NSAIDs and steroids.  Will refer to PT    Follow up in 1 month to re-assess          Follow Up   Return in about 1 month (around 5/1/2025) for Recheck.  Patient was given instructions and counseling regarding his condition or for health maintenance advice. Please see specific information pulled into the AVS if appropriate.

## 2025-05-14 DIAGNOSIS — F90.9 ATTENTION DEFICIT HYPERACTIVITY DISORDER (ADHD), UNSPECIFIED ADHD TYPE: ICD-10-CM

## 2025-05-14 DIAGNOSIS — F41.9 ANXIETY: ICD-10-CM

## 2025-05-16 NOTE — TELEPHONE ENCOUNTER
Rx Refill Note  Requested Prescriptions     Pending Prescriptions Disp Refills    amphetamine-dextroamphetamine XR (Adderall XR) 30 MG 24 hr capsule 30 capsule 0     Sig: Take 1 capsule by mouth Every Morning    desvenlafaxine (Pristiq) 50 MG 24 hr tablet 90 tablet 1     Sig: Take 1 tablet by mouth Daily.      Last office visit with prescribing clinician: 4/1/2025   Last telemedicine visit with prescribing clinician: 2/7/2025   Next office visit with prescribing clinician: No upcoming appointments.   {  CSA 11/05/2024 ADDERALL  UDS 10/31/2024    Camryn Gonsales MA  05/16/25, 14:48 CDT

## 2025-05-19 RX ORDER — DEXTROAMPHETAMINE SACCHARATE, AMPHETAMINE ASPARTATE MONOHYDRATE, DEXTROAMPHETAMINE SULFATE AND AMPHETAMINE SULFATE 7.5; 7.5; 7.5; 7.5 MG/1; MG/1; MG/1; MG/1
30 CAPSULE, EXTENDED RELEASE ORAL EVERY MORNING
Qty: 30 CAPSULE | Refills: 0 | Status: SHIPPED | OUTPATIENT
Start: 2025-05-19

## 2025-05-19 RX ORDER — DESVENLAFAXINE 50 MG/1
50 TABLET, FILM COATED, EXTENDED RELEASE ORAL DAILY
Qty: 90 TABLET | Refills: 1 | Status: SHIPPED | OUTPATIENT
Start: 2025-05-19